# Patient Record
Sex: FEMALE | Race: WHITE | NOT HISPANIC OR LATINO | Employment: FULL TIME | URBAN - METROPOLITAN AREA
[De-identification: names, ages, dates, MRNs, and addresses within clinical notes are randomized per-mention and may not be internally consistent; named-entity substitution may affect disease eponyms.]

---
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
## 2020-05-01 ENCOUNTER — RECORDS - HEALTHEAST (OUTPATIENT)
Dept: LAB | Facility: CLINIC | Age: 20
End: 2020-05-01

## 2020-05-05 LAB
C TRACH DNA SPEC QL PROBE+SIG AMP: NEGATIVE
N GONORRHOEA DNA SPEC QL NAA+PROBE: NEGATIVE

## 2020-09-18 ENCOUNTER — RECORDS - HEALTHEAST (OUTPATIENT)
Dept: LAB | Facility: CLINIC | Age: 20
End: 2020-09-18

## 2020-09-18 LAB — HIV 1+2 AB+HIV1 P24 AG SERPL QL IA: NEGATIVE

## 2020-09-19 LAB — T PALLIDUM AB SER QL: NEGATIVE

## 2020-09-24 LAB
C TRACH DNA SPEC QL PROBE+SIG AMP: POSITIVE
N GONORRHOEA DNA SPEC QL NAA+PROBE: NEGATIVE

## 2021-08-09 ENCOUNTER — LAB REQUISITION (OUTPATIENT)
Dept: LAB | Facility: CLINIC | Age: 21
End: 2021-08-09

## 2021-08-09 DIAGNOSIS — Z11.3 ENCOUNTER FOR SCREENING FOR INFECTIONS WITH A PREDOMINANTLY SEXUAL MODE OF TRANSMISSION: ICD-10-CM

## 2021-08-09 LAB — HIV 1+2 AB+HIV1 P24 AG SERPL QL IA: NEGATIVE

## 2021-08-09 PROCEDURE — 87389 HIV-1 AG W/HIV-1&-2 AB AG IA: CPT | Performed by: PHYSICIAN ASSISTANT

## 2021-08-09 PROCEDURE — 87491 CHLMYD TRACH DNA AMP PROBE: CPT | Performed by: PHYSICIAN ASSISTANT

## 2021-08-09 PROCEDURE — 86780 TREPONEMA PALLIDUM: CPT | Performed by: PHYSICIAN ASSISTANT

## 2021-08-09 PROCEDURE — 87591 N.GONORRHOEAE DNA AMP PROB: CPT | Performed by: PHYSICIAN ASSISTANT

## 2021-08-10 LAB — T PALLIDUM AB SER QL: NEGATIVE

## 2021-08-11 LAB
C TRACH DNA SPEC QL NAA+PROBE: POSITIVE
N GONORRHOEA DNA SPEC QL NAA+PROBE: NEGATIVE

## 2023-01-11 ENCOUNTER — ANESTHESIA (OUTPATIENT)
Dept: SURGERY | Facility: HOSPITAL | Age: 23
End: 2023-01-11
Payer: COMMERCIAL

## 2023-01-11 ENCOUNTER — LAB REQUISITION (OUTPATIENT)
Dept: LAB | Facility: CLINIC | Age: 23
End: 2023-01-11

## 2023-01-11 ENCOUNTER — HOSPITAL ENCOUNTER (OUTPATIENT)
Facility: HOSPITAL | Age: 23
Discharge: HOME | End: 2023-01-11
Attending: EMERGENCY MEDICINE | Admitting: OBSTETRICS & GYNECOLOGY
Payer: COMMERCIAL

## 2023-01-11 ENCOUNTER — APPOINTMENT (OUTPATIENT)
Dept: ULTRASOUND IMAGING | Facility: HOSPITAL | Age: 23
End: 2023-01-11
Attending: EMERGENCY MEDICINE
Payer: COMMERCIAL

## 2023-01-11 ENCOUNTER — ANESTHESIA EVENT (OUTPATIENT)
Dept: SURGERY | Facility: HOSPITAL | Age: 23
End: 2023-01-11
Payer: COMMERCIAL

## 2023-01-11 VITALS
OXYGEN SATURATION: 98 % | HEART RATE: 88 BPM | TEMPERATURE: 97.9 F | BODY MASS INDEX: 25.13 KG/M2 | RESPIRATION RATE: 24 BRPM | WEIGHT: 151 LBS | DIASTOLIC BLOOD PRESSURE: 70 MMHG | SYSTOLIC BLOOD PRESSURE: 131 MMHG

## 2023-01-11 DIAGNOSIS — O20.9 HEMORRHAGE IN EARLY PREGNANCY, UNSPECIFIED: ICD-10-CM

## 2023-01-11 DIAGNOSIS — O00.90 RUPTURED ECTOPIC PREGNANCY: ICD-10-CM

## 2023-01-11 PROBLEM — O00.102 RUPTURED LEFT TUBAL ECTOPIC PREGNANCY CAUSING HEMOPERITONEUM: Status: ACTIVE | Noted: 2023-01-11

## 2023-01-11 PROBLEM — O00.102 RUPTURED LEFT TUBAL ECTOPIC PREGNANCY CAUSING HEMOPERITONEUM: Status: RESOLVED | Noted: 2023-01-11 | Resolved: 2023-01-11

## 2023-01-11 PROBLEM — K66.1 RUPTURED LEFT TUBAL ECTOPIC PREGNANCY CAUSING HEMOPERITONEUM: Status: RESOLVED | Noted: 2023-01-11 | Resolved: 2023-01-11

## 2023-01-11 PROBLEM — K66.1 RUPTURED LEFT TUBAL ECTOPIC PREGNANCY CAUSING HEMOPERITONEUM: Status: ACTIVE | Noted: 2023-01-11

## 2023-01-11 LAB
ABO/RH(D): NORMAL
ANION GAP SERPL CALCULATED.3IONS-SCNC: 10 MMOL/L (ref 7–15)
BASOPHILS # BLD AUTO: 0.1 10E3/UL (ref 0–0.2)
BASOPHILS NFR BLD AUTO: 1 %
BLD PROD TYP BPU: NORMAL
BLD PROD TYP BPU: NORMAL
BLOOD COMPONENT TYPE: NORMAL
BLOOD COMPONENT TYPE: NORMAL
BUN SERPL-MCNC: 6.4 MG/DL (ref 6–20)
CALCIUM SERPL-MCNC: 9.5 MG/DL (ref 8.6–10)
CHLORIDE SERPL-SCNC: 102 MMOL/L (ref 98–107)
CODING SYSTEM: NORMAL
CODING SYSTEM: NORMAL
CREAT SERPL-MCNC: 0.63 MG/DL (ref 0.51–0.95)
DEPRECATED HCO3 PLAS-SCNC: 26 MMOL/L (ref 22–29)
EOSINOPHIL # BLD AUTO: 0 10E3/UL (ref 0–0.7)
EOSINOPHIL NFR BLD AUTO: 1 %
ERYTHROCYTE [DISTWIDTH] IN BLOOD BY AUTOMATED COUNT: 12.6 % (ref 10–15)
GFR SERPL CREATININE-BSD FRML MDRD: >90 ML/MIN/1.73M2
GLUCOSE SERPL-MCNC: 93 MG/DL (ref 70–99)
HCG INTACT+B SERPL-ACNC: 4781 MIU/ML
HCT VFR BLD AUTO: 38 % (ref 35–47)
HGB BLD-MCNC: 12.5 G/DL (ref 11.7–15.7)
IMM GRANULOCYTES # BLD: 0 10E3/UL
IMM GRANULOCYTES NFR BLD: 0 %
LYMPHOCYTES # BLD AUTO: 2.1 10E3/UL (ref 0.8–5.3)
LYMPHOCYTES NFR BLD AUTO: 26 %
MCH RBC QN AUTO: 30.5 PG (ref 26.5–33)
MCHC RBC AUTO-ENTMCNC: 32.9 G/DL (ref 31.5–36.5)
MCV RBC AUTO: 93 FL (ref 78–100)
MONOCYTES # BLD AUTO: 0.5 10E3/UL (ref 0–1.3)
MONOCYTES NFR BLD AUTO: 6 %
NEUTROPHILS # BLD AUTO: 5.5 10E3/UL (ref 1.6–8.3)
NEUTROPHILS NFR BLD AUTO: 66 %
NRBC # BLD AUTO: 0 10E3/UL
NRBC BLD AUTO-RTO: 0 /100
PLATELET # BLD AUTO: 250 10E3/UL (ref 150–450)
POTASSIUM SERPL-SCNC: 3.6 MMOL/L (ref 3.4–5.3)
RADIOLOGIST FLAGS: ABNORMAL
RBC # BLD AUTO: 4.1 10E6/UL (ref 3.8–5.2)
SODIUM SERPL-SCNC: 138 MMOL/L (ref 136–145)
SPECIMEN EXPIRATION DATE: NORMAL
UNIT ABO/RH: NORMAL
UNIT ABO/RH: NORMAL
UNIT NUMBER: NORMAL
UNIT NUMBER: NORMAL
UNIT STATUS: NORMAL
UNIT STATUS: NORMAL
UNIT TYPE ISBT: 7300
UNIT TYPE ISBT: 7300
WBC # BLD AUTO: 8.1 10E3/UL (ref 4–11)

## 2023-01-11 PROCEDURE — 76801 OB US < 14 WKS SINGLE FETUS: CPT

## 2023-01-11 PROCEDURE — 99285 EMERGENCY DEPT VISIT HI MDM: CPT | Mod: 25

## 2023-01-11 PROCEDURE — 250N000025 HC SEVOFLURANE, PER MIN: Performed by: OBSTETRICS & GYNECOLOGY

## 2023-01-11 PROCEDURE — 258N000003 HC RX IP 258 OP 636: Performed by: OBSTETRICS & GYNECOLOGY

## 2023-01-11 PROCEDURE — 87491 CHLMYD TRACH DNA AMP PROBE: CPT | Performed by: OBSTETRICS & GYNECOLOGY

## 2023-01-11 PROCEDURE — 710N000012 HC RECOVERY PHASE 2, PER MINUTE: Performed by: OBSTETRICS & GYNECOLOGY

## 2023-01-11 PROCEDURE — 84702 CHORIONIC GONADOTROPIN TEST: CPT | Performed by: FAMILY MEDICINE

## 2023-01-11 PROCEDURE — 36415 COLL VENOUS BLD VENIPUNCTURE: CPT | Performed by: EMERGENCY MEDICINE

## 2023-01-11 PROCEDURE — 96360 HYDRATION IV INFUSION INIT: CPT

## 2023-01-11 PROCEDURE — 80048 BASIC METABOLIC PNL TOTAL CA: CPT | Performed by: EMERGENCY MEDICINE

## 2023-01-11 PROCEDURE — 250N000011 HC RX IP 250 OP 636: Performed by: ANESTHESIOLOGY

## 2023-01-11 PROCEDURE — 258N000003 HC RX IP 258 OP 636: Performed by: EMERGENCY MEDICINE

## 2023-01-11 PROCEDURE — 250N000011 HC RX IP 250 OP 636: Performed by: OBSTETRICS & GYNECOLOGY

## 2023-01-11 PROCEDURE — 272N000001 HC OR GENERAL SUPPLY STERILE: Performed by: OBSTETRICS & GYNECOLOGY

## 2023-01-11 PROCEDURE — 999N000141 HC STATISTIC PRE-PROCEDURE NURSING ASSESSMENT: Performed by: OBSTETRICS & GYNECOLOGY

## 2023-01-11 PROCEDURE — 370N000017 HC ANESTHESIA TECHNICAL FEE, PER MIN: Performed by: OBSTETRICS & GYNECOLOGY

## 2023-01-11 PROCEDURE — 86901 BLOOD TYPING SEROLOGIC RH(D): CPT | Performed by: EMERGENCY MEDICINE

## 2023-01-11 PROCEDURE — 96361 HYDRATE IV INFUSION ADD-ON: CPT | Mod: 59

## 2023-01-11 PROCEDURE — 258N000003 HC RX IP 258 OP 636: Performed by: ANESTHESIOLOGY

## 2023-01-11 PROCEDURE — 88305 TISSUE EXAM BY PATHOLOGIST: CPT | Mod: TC | Performed by: OBSTETRICS & GYNECOLOGY

## 2023-01-11 PROCEDURE — 710N000009 HC RECOVERY PHASE 1, LEVEL 1, PER MIN: Performed by: OBSTETRICS & GYNECOLOGY

## 2023-01-11 PROCEDURE — 360N000076 HC SURGERY LEVEL 3, PER MIN: Performed by: OBSTETRICS & GYNECOLOGY

## 2023-01-11 PROCEDURE — 88305 TISSUE EXAM BY PATHOLOGIST: CPT | Mod: 26 | Performed by: PATHOLOGY

## 2023-01-11 PROCEDURE — 250N000009 HC RX 250: Performed by: OBSTETRICS & GYNECOLOGY

## 2023-01-11 PROCEDURE — 84702 CHORIONIC GONADOTROPIN TEST: CPT | Performed by: EMERGENCY MEDICINE

## 2023-01-11 PROCEDURE — 250N000009 HC RX 250: Performed by: ANESTHESIOLOGY

## 2023-01-11 PROCEDURE — 85025 COMPLETE CBC W/AUTO DIFF WBC: CPT | Performed by: EMERGENCY MEDICINE

## 2023-01-11 RX ORDER — ONDANSETRON 4 MG/1
4 TABLET, ORALLY DISINTEGRATING ORAL EVERY 30 MIN PRN
Status: DISCONTINUED | OUTPATIENT
Start: 2023-01-11 | End: 2023-01-11 | Stop reason: HOSPADM

## 2023-01-11 RX ORDER — FENTANYL CITRATE 50 UG/ML
25 INJECTION, SOLUTION INTRAMUSCULAR; INTRAVENOUS EVERY 5 MIN PRN
Status: DISCONTINUED | OUTPATIENT
Start: 2023-01-11 | End: 2023-01-11 | Stop reason: HOSPADM

## 2023-01-11 RX ORDER — IBUPROFEN 200 MG
800 TABLET ORAL ONCE
Status: DISCONTINUED | OUTPATIENT
Start: 2023-01-11 | End: 2023-01-11 | Stop reason: HOSPADM

## 2023-01-11 RX ORDER — FENTANYL CITRATE 50 UG/ML
50 INJECTION, SOLUTION INTRAMUSCULAR; INTRAVENOUS EVERY 5 MIN PRN
Status: DISCONTINUED | OUTPATIENT
Start: 2023-01-11 | End: 2023-01-11 | Stop reason: HOSPADM

## 2023-01-11 RX ORDER — ONDANSETRON 2 MG/ML
INJECTION INTRAMUSCULAR; INTRAVENOUS PRN
Status: DISCONTINUED | OUTPATIENT
Start: 2023-01-11 | End: 2023-01-11

## 2023-01-11 RX ORDER — PROPOFOL 10 MG/ML
INJECTION, EMULSION INTRAVENOUS PRN
Status: DISCONTINUED | OUTPATIENT
Start: 2023-01-11 | End: 2023-01-11

## 2023-01-11 RX ORDER — HALOPERIDOL 5 MG/ML
1 INJECTION INTRAMUSCULAR
Status: DISCONTINUED | OUTPATIENT
Start: 2023-01-11 | End: 2023-01-11 | Stop reason: HOSPADM

## 2023-01-11 RX ORDER — SODIUM CHLORIDE, SODIUM LACTATE, POTASSIUM CHLORIDE, CALCIUM CHLORIDE 600; 310; 30; 20 MG/100ML; MG/100ML; MG/100ML; MG/100ML
INJECTION, SOLUTION INTRAVENOUS CONTINUOUS
Status: DISCONTINUED | OUTPATIENT
Start: 2023-01-11 | End: 2023-01-11 | Stop reason: HOSPADM

## 2023-01-11 RX ORDER — FENTANYL CITRATE 50 UG/ML
INJECTION, SOLUTION INTRAMUSCULAR; INTRAVENOUS PRN
Status: DISCONTINUED | OUTPATIENT
Start: 2023-01-11 | End: 2023-01-11

## 2023-01-11 RX ORDER — DEXAMETHASONE SODIUM PHOSPHATE 10 MG/ML
INJECTION, SOLUTION INTRAMUSCULAR; INTRAVENOUS PRN
Status: DISCONTINUED | OUTPATIENT
Start: 2023-01-11 | End: 2023-01-11

## 2023-01-11 RX ORDER — SODIUM CHLORIDE, SODIUM LACTATE, POTASSIUM CHLORIDE, AND CALCIUM CHLORIDE .6; .31; .03; .02 G/100ML; G/100ML; G/100ML; G/100ML
IRRIGANT IRRIGATION PRN
Status: DISCONTINUED | OUTPATIENT
Start: 2023-01-11 | End: 2023-01-11 | Stop reason: HOSPADM

## 2023-01-11 RX ORDER — LIDOCAINE 40 MG/G
CREAM TOPICAL
Status: DISCONTINUED | OUTPATIENT
Start: 2023-01-11 | End: 2023-01-11 | Stop reason: HOSPADM

## 2023-01-11 RX ORDER — OXYCODONE HYDROCHLORIDE 5 MG/1
5-10 TABLET ORAL EVERY 4 HOURS PRN
Qty: 10 TABLET | Refills: 0 | Status: SHIPPED | OUTPATIENT
Start: 2023-01-11

## 2023-01-11 RX ORDER — LIDOCAINE HYDROCHLORIDE 10 MG/ML
INJECTION, SOLUTION INFILTRATION; PERINEURAL PRN
Status: DISCONTINUED | OUTPATIENT
Start: 2023-01-11 | End: 2023-01-11

## 2023-01-11 RX ORDER — KETOROLAC TROMETHAMINE 30 MG/ML
15 INJECTION, SOLUTION INTRAMUSCULAR; INTRAVENOUS EVERY 6 HOURS PRN
Status: DISCONTINUED | OUTPATIENT
Start: 2023-01-11 | End: 2023-01-11 | Stop reason: HOSPADM

## 2023-01-11 RX ORDER — ONDANSETRON 2 MG/ML
4 INJECTION INTRAMUSCULAR; INTRAVENOUS EVERY 30 MIN PRN
Status: DISCONTINUED | OUTPATIENT
Start: 2023-01-11 | End: 2023-01-11 | Stop reason: HOSPADM

## 2023-01-11 RX ORDER — ACETAMINOPHEN 325 MG/1
975 TABLET ORAL ONCE
Status: DISCONTINUED | OUTPATIENT
Start: 2023-01-11 | End: 2023-01-11 | Stop reason: HOSPADM

## 2023-01-11 RX ORDER — IBUPROFEN 600 MG/1
600 TABLET, FILM COATED ORAL EVERY 6 HOURS PRN
Qty: 30 TABLET | Refills: 0 | Status: SHIPPED | OUTPATIENT
Start: 2023-01-11

## 2023-01-11 RX ORDER — PROPOFOL 10 MG/ML
INJECTION, EMULSION INTRAVENOUS CONTINUOUS PRN
Status: DISCONTINUED | OUTPATIENT
Start: 2023-01-11 | End: 2023-01-11

## 2023-01-11 RX ORDER — BUPIVACAINE HYDROCHLORIDE AND EPINEPHRINE 2.5; 5 MG/ML; UG/ML
INJECTION, SOLUTION EPIDURAL; INFILTRATION; INTRACAUDAL; PERINEURAL PRN
Status: DISCONTINUED | OUTPATIENT
Start: 2023-01-11 | End: 2023-01-11 | Stop reason: HOSPADM

## 2023-01-11 RX ORDER — CEFAZOLIN SODIUM/WATER 2 G/20 ML
SYRINGE (ML) INTRAVENOUS
Status: DISCONTINUED
Start: 2023-01-11 | End: 2023-01-11 | Stop reason: HOSPADM

## 2023-01-11 RX ADMIN — DEXAMETHASONE SODIUM PHOSPHATE 4 MG: 10 INJECTION, SOLUTION INTRAMUSCULAR; INTRAVENOUS at 18:01

## 2023-01-11 RX ADMIN — SODIUM CHLORIDE, POTASSIUM CHLORIDE, SODIUM LACTATE AND CALCIUM CHLORIDE: 600; 310; 30; 20 INJECTION, SOLUTION INTRAVENOUS at 17:48

## 2023-01-11 RX ADMIN — ROCURONIUM BROMIDE 30 MG: 50 INJECTION, SOLUTION INTRAVENOUS at 18:00

## 2023-01-11 RX ADMIN — PROPOFOL 200 MG: 10 INJECTION, EMULSION INTRAVENOUS at 17:54

## 2023-01-11 RX ADMIN — DEXAMETHASONE SODIUM PHOSPHATE 4 MG: 10 INJECTION, SOLUTION INTRAMUSCULAR; INTRAVENOUS at 17:58

## 2023-01-11 RX ADMIN — PHENYLEPHRINE HYDROCHLORIDE 50 MCG: 10 INJECTION INTRAVENOUS at 18:11

## 2023-01-11 RX ADMIN — FENTANYL CITRATE 50 MCG: 50 INJECTION, SOLUTION INTRAMUSCULAR; INTRAVENOUS at 19:10

## 2023-01-11 RX ADMIN — SUGAMMADEX 200 MG: 100 INJECTION, SOLUTION INTRAVENOUS at 18:41

## 2023-01-11 RX ADMIN — LIDOCAINE HYDROCHLORIDE 5 ML: 10 INJECTION, SOLUTION INFILTRATION; PERINEURAL at 17:54

## 2023-01-11 RX ADMIN — HYDROMORPHONE HYDROCHLORIDE 0.4 MG: 1 INJECTION, SOLUTION INTRAMUSCULAR; INTRAVENOUS; SUBCUTANEOUS at 19:42

## 2023-01-11 RX ADMIN — PROPOFOL 200 MCG/KG/MIN: 10 INJECTION, EMULSION INTRAVENOUS at 17:53

## 2023-01-11 RX ADMIN — FENTANYL CITRATE 50 MCG: 50 INJECTION, SOLUTION INTRAMUSCULAR; INTRAVENOUS at 19:32

## 2023-01-11 RX ADMIN — SODIUM CHLORIDE 1000 ML: 9 INJECTION, SOLUTION INTRAVENOUS at 14:15

## 2023-01-11 RX ADMIN — Medication 100 MG: at 17:54

## 2023-01-11 RX ADMIN — KETOROLAC TROMETHAMINE 15 MG: 30 INJECTION, SOLUTION INTRAMUSCULAR; INTRAVENOUS at 20:17

## 2023-01-11 RX ADMIN — FENTANYL CITRATE 100 MCG: 50 INJECTION, SOLUTION INTRAMUSCULAR; INTRAVENOUS at 17:54

## 2023-01-11 RX ADMIN — MIDAZOLAM 2 MG: 1 INJECTION INTRAMUSCULAR; INTRAVENOUS at 17:48

## 2023-01-11 RX ADMIN — HYDROMORPHONE HYDROCHLORIDE 0.4 MG: 1 INJECTION, SOLUTION INTRAMUSCULAR; INTRAVENOUS; SUBCUTANEOUS at 19:37

## 2023-01-11 RX ADMIN — ONDANSETRON 4 MG: 2 INJECTION INTRAMUSCULAR; INTRAVENOUS at 18:01

## 2023-01-11 ASSESSMENT — ACTIVITIES OF DAILY LIVING (ADL)
ADLS_ACUITY_SCORE: 35

## 2023-01-11 NOTE — CONSULTS
H&P - GYN     NAME: Kay Vieyra   : 2000   MRN: [id number]      [facility]    ADMISSION DATE: 2023    PCP:  Mahi Marie     CHIEF COMPLAINT: adnexal mass, fluid in the pelvis    HPI: I have been requested by Dr. Powell to evaluate Kay Vieyra for an adnexal mass which is suspicious for ectopic pregnancy. Patient is a 22 year old,  female that presented to the hospital from her PCP with concern for ectopic pregnancy. History is obtained through the patient and chart review. Patient reports that she has had the pain for 4 days and it was at it worst 4 days ago. . Patient admits to a history of  Chlamydia in 2021 and Gonorrhea in January and 2019     GYN/Menstral History: She has been taking birth control pills and doesn't usually miss any pills.  However, she moved in late November and mis-placed her pills.  Her pharmacy would not refill them, therefore she has been without.  She was unaware she was pregnant until today. Patient's last menstrual period was 2022. She reports the bleeding has been heavier than previously with her periods.      PAST MEDICAL HISTORY:  History reviewed. No pertinent past medical history.    PAST SURGICAL HISTORY:  History reviewed. No pertinent surgical history.   Appendectomy - laparoscopic    SOCIAL HISTORY:  Social History     Socioeconomic History     Marital status: Single     Spouse name: Not on file     Number of children: Not on file     Years of education: Not on file     Highest education level: Not on file   Occupational History     Not on file   Tobacco Use     Smoking status: Never     Smokeless tobacco: Never   Substance and Sexual Activity     Alcohol use: Never     Drug use: Never     Sexual activity: Yes     Partners: Male     Comment: Pill.    Other Topics Concern     Not on file   Social History Narrative     Not on file     Social Determinants of Health     Financial Resource Strain: Not on file   Food  Insecurity: Not on file   Transportation Needs: Not on file   Physical Activity: Not on file   Stress: Not on file   Social Connections: Not on file   Intimate Partner Violence: Not on file   Housing Stability: Not on file       MEDICATIONS:  Current Facility-Administered Medications   Medication     lactated ringers infusion     lidocaine (LMX4) cream     lidocaine 1 % 0.1-1 mL     sodium chloride (PF) 0.9% PF flush 3 mL     sodium chloride (PF) 0.9% PF flush 3 mL       ALLERGIES:  Allergies   Allergen Reactions     Sumatriptan Anaphylaxis       REVIEW OF SYSTEMS    Negative except what is stated in the HPI    PHYSICAL EXAM:  /66   Pulse 91   Temp 98.8  F (37.1  C) (Oral)   Resp 14   Wt 68.5 kg (151 lb)   LMP 12/06/2022   SpO2 99%   BMI 25.13 kg/m     General Appearance: Alert, appropriate appearance for age. No acute distress,   HEENT : Grossly normal  Chest/Respiratory: Normal chest wall and respirations. Clear to auscultation.,   Cardiovascular: Regular rate and rhythm   Gastrointestinal: left lower quadrant tenderness, no rebound or gaurding  Pelvic Exam Female:  deferred   Musculoskeletal Exam: Back IS non-tender, no cva tenderness, moving all four extremities  Skin: no rash or abnormalities,   Neurologic: Normal gait and speech, no tremor  Psychiatric: Alert and oriented, appropriate affect.    LABS   Latest Reference Range & Units 01/11/23 14:03   Sodium 136 - 145 mmol/L 138   Potassium 3.4 - 5.3 mmol/L 3.6   Chloride 98 - 107 mmol/L 102   Carbon Dioxide (CO2) 22 - 29 mmol/L 26   Urea Nitrogen 6.0 - 20.0 mg/dL 6.4   Creatinine 0.51 - 0.95 mg/dL 0.63   GFR Estimate >60 mL/min/1.73m2 >90   Calcium 8.6 - 10.0 mg/dL 9.5   Anion Gap 7 - 15 mmol/L 10   Glucose 70 - 99 mg/dL 93   hCG Quantitative <5 mIU/mL 4,781 (H)   WBC 4.0 - 11.0 10e3/uL 8.1   Hemoglobin 11.7 - 15.7 g/dL 12.5   Hematocrit 35.0 - 47.0 % 38.0   Platelet Count 150 - 450 10e3/uL 250   RBC Count 3.80 - 5.20 10e6/uL 4.10   MCV 78 - 100 fL  93   MCH 26.5 - 33.0 pg 30.5   MCHC 31.5 - 36.5 g/dL 32.9   RDW 10.0 - 15.0 % 12.6   % Neutrophils % 66   % Lymphocytes % 26   % Monocytes % 6   % Eosinophils % 1   % Basophils % 1   Absolute Basophils 0.0 - 0.2 10e3/uL 0.1   Absolute Eosinophils 0.0 - 0.7 10e3/uL 0.0   Absolute Immature Granulocytes <=0.4 10e3/uL 0.0   Absolute Lymphocytes 0.8 - 5.3 10e3/uL 2.1   Absolute Monocytes 0.0 - 1.3 10e3/uL 0.5   % Immature Granulocytes % 0   Absolute Neutrophils 1.6 - 8.3 10e3/uL 5.5   Absolute NRBCs 10e3/uL 0.0   NRBCs per 100 WBC <1 /100 0   ABO/Rh(D)  B POS   SPECIMEN EXPIRATION DATE  [id number]   (H): Data is abnormally high    Lab Results: personally reviewed.     IMAGING:  Narrative & Impression   EXAM: US OB <14 WEEKS WITH TRANSVAGINAL SINGLE  LOCATION: [facility]  DATE/TIME: 1/11/2023 4:07 PM     INDICATION: Possible ectopic.  COMPARISON: None.  TECHNIQUE: Transabdominal scans were performed. Endovaginal ultrasound was performed to better visualize the embryo.     FINDINGS:     UTERUS: No intrauterine gestational sac, yolk sac, or fetal pole is identified. No fibroids.     ENDOMETRIUM: Homogenous, smooth endometrium measuring 6 mm in thickness.     RIGHT OVARY: Measures 2.8 x 1.6 x 1.4 cm.     LEFT OVARY: Measures 4.0 x 2.3 x 2.6 cm and contains a small involuting corpus luteal cyst. There is an adjacent complex 2 x 2.3 x 1.6 cm left adnexal mass with Doppler flow around its periphery.     Large amount of complex free fluid within the pelvis.                                                                      IMPRESSION:      1.  Findings suspicious for a ruptured left adnexal ectopic pregnancy. No intrauterine pregnancy is identified, and there is a complex 2.3 cm left adnexal mass with a large amount of complex free fluid within the pelvis.     [Critical Result: Positive ectopic pregnancy]     Finding was identified on 1/11/2023 4:21 PM.      Dr. Powell was contacted by me on  1/11/2023 4:30 PM and verbalized understanding of the critical result.         Radiology Results: Personally reviewed impression/s    IMPRESSION:  22 year old female with left ectopic pregnancy    RECOMMENDATIONS:  Discussed findings with patient   Recommend diagnostic laparoscopy with possible left salpingectomy, will also evaluate right fallopian tube as indicated with tubal dye study.      Thank you for allowing us to participate in the care of this patient.  Please contact us with any questions/concerns.      Yojana Nesbitt DO, YUSUF, FACOG  [facility]      CC: Mahi Marie,

## 2023-01-11 NOTE — ED TRIAGE NOTES
Patient presents here for evaluation of left pelvic pain that began about 5 days ago. She is 4 weeks and 8 days gestation. She was seen at another clinic and sent here for further work up. She notes heavy vaginal bleeding.The provider was unsure of site of pregnancy. There was nothing seen within the uterus.

## 2023-01-11 NOTE — ED NOTES
"ED Provider In Triage Note  [facility]  Encounter Date: Jan 11, 2023    Chief Complaint   Patient presents with     Abdominal Pain       Brief HPI:   Kay Vieyra is a 22 year old female presenting to the Emergency Department with a chief complaint of LLQ pain, possible ectopic pregnancy on US.  Patient in clinic this AM, abnormal US, no intrauterine pregnancy, abnormality left fallopian tube and ovary per patient.  Patient told \"possible ectopic\" referred to ED for assessment.  5 weeks by LMP.  G1 PO.    Brief Physical Exam:  LMP 12/06/2022   General: Non-toxic appearing  HEENT: Atraumatic  Resp: No respiratory distress  Abdomen: Non-peritoneal  Neuro: Alert, oriented, answers questions appropriately  Psych: Behavior appropriate      Plan Initiated in Triage:  Orders Placed This Encounter     OB  US 1st trimester w transvag     Basic metabolic panel     HCG quantitative pregnancy     0.9% sodium chloride BOLUS       PIT Dispo:   Place patient in the next available ED bed    Adan Cespedes MD on 1/11/2023 at 1:45 PM    Patient was evaluated by the Physician in Triage due to a limitation of available rooms in the Emergency Department. A plan of care was discussed based on the information obtained on the initial evaluation and patient was consuled to return back to the Emergency Department lobby after this initial evalutaiton until results were obtained or a room became available in the Emergency Department. Patient was counseled not to leave prior to receiving the results of their workup.     Adan Cespedes MD  [facility] EMERGENCY DEPARTMENT  [address] [place] [id number]  [phone]     Adan Cespedes MD  01/11/23 1349    "

## 2023-01-11 NOTE — ED NOTES
Expected Patient Referral to ED  12:32 PM    Referring Clinic/Provider:  [facility]    Reason for referral/Clinical facts:  Possible ruptured ectopic    Recommendations provided:  Send to ED for further evaluation- check with OR to assure availability    Caller was informed that this institution does possess the capabilities and/or resources to provide for patient and should be transferred to our facility.    Discussed that if direct admit is sought and any hurdles are encountered, this ED would be happy to see the patient and evaluate.    Informed caller that recommendations provided are recommendations based only on the facts provided and that they responsible to accept or reject the advice, or to seek a formal in person consultation as needed and that this ED will see/treat patient should they arrive.      Jamin Starr MD  [facility] EMERGENCY DEPARTMENT  [address] [place] [id number]  [phone]     Jamin Starr MD  01/11/23 [id number]

## 2023-01-11 NOTE — ED PROVIDER NOTES
Emergency Department Encounter     Evaluation Date & Time:   2023  1:59 PM    CHIEF COMPLAINT:  Abdominal Pain      Triage Note:  Patient presents here for evaluation of left pelvic pain that began about 5 days ago. She is 4 weeks and 8 days gestation. She was seen at another clinic and sent here for further work up. She notes heavy vaginal bleeding.The provider was unsure of site of pregnancy. There was nothing seen within the uterus.                ED COURSE & MEDICAL DECISION MAKING:     ED Course as of 23   1451 CBC reassuring with normal Hgb at 12.6.  Rh+, so no Rhogam needed.     1640 US showing ruptured ectopic.  Will call obgyn to discuss.  Pt updated on results, plan.  Remains hemodynamically stable, Hgb currently 12.5, minimal pain.   1646 OB  US 1st trimester w transvag     Pt is a  at approximately 4 W with home pregnancy test + today, seen in outpatient clinic for 5 days of vaginal bleeding and worsening LLQ abd pain.  Reportedly outpatient US showing abnormal findings along left ovary/fallopian tube, but I do not have official report or images.  Pt seen through triage and labs, US ordered.  Pt currently with very benign abdomen, afebrile, normal CBC. Awaiting Hcg and US.  Rh+, so no need for Rhogam.  Will consult Ob if US abnormal, concerning for ectopic, which is primary emergent concern at this point.      2:30 PM I met with the patient, obtained history, performed an initial exam, and discussed options and plan for diagnostics and treatment here in the ED.  4:49 PM I spoke with Dr Nesbitt, OB, who will come in to evaluate the patient.   4:50 PM Pt aware/updated on this plan/discussion.      Dr. Nesbitt saw pt in ED and took her to the OR.  I was in with another critical patient for a prolonged period, so pt left with Ob surgical team while I was in with another patient.      At the conclusion of the encounter I discussed the results of all the tests and the  disposition. The questions were answered. The patient or family acknowledged understanding and was agreeable with the care plan.    Medical Decision Making    History:    Supplemental history from: Documented in HPI, if applicable    External Record(s) reviewed: Documented in \[facility] [place] [facility]\"", if applicable.    Work Up:    Chart documentation includes differential considered and any EKGs or imaging independently interpreted by provider.    In additional to work up documented, I considered the following work up: See chart documentation, if applicable.    External consultation:    Discussion of management with another provider: See chart documentation, if applicable    Complicating factors:    Care impacted by chronic illness: N/A    Care affected by social determinants of health: N/A    Disposition considerations: Admit.        MEDICATIONS GIVEN IN THE EMERGENCY DEPARTMENT:  Medications   acetaminophen (TYLENOL) tablet 975 mg (has no administration in time range)   ibuprofen (ADVIL/MOTRIN) tablet 800 mg (has no administration in time range)   ketorolac (TORADOL) injection 15 mg (15 mg Intravenous Given 23)   0.9% sodium chloride BOLUS (0 mLs Intravenous Stopped 23)       NEW PRESCRIPTIONS STARTED AT TODAY'S ED VISIT:  Current Discharge Medication List      START taking these medications    Details   oxyCODONE (ROXICODONE) 5 MG tablet Take 1-2 tablets (5-10 mg) by mouth every 4 hours as needed for moderate to severe pain  Qty: 10 tablet, Refills: 0    Associated Diagnoses: Ruptured ectopic pregnancy             [facility]     Kay Vieyra is a  with LMP 22, 22 year old female with a pertinent history of appendicitis who presents to this ED walk in for evaluation of vaginal bleeding and LLQ abdominal pain. Patient was taking oral birth control, to control her heavy periods. Patient states she has not been taking it for the last 3 weeks because she lost it.    Patient reports that 5 days ago she  began to have heavy bleeding with associated LLQ abd pain that has been worsening.  Now reporting feeling drowsy/dizzy. 4 days ago she reports having a headache and diarrhea.     Patient found out she was pregnant this morning while consulting with her PCP at [facility]. Imagining found her left ovary is inflamed and other findings regarding her left fallopian tube. Denies any sort of prior miscarriage or elective .    SHx - nicotine vape, marijuana, 1 alcoholic beverage per week.    REVIEW OF SYSTEMS:  Review of Systems   All other systems reviewed and are negative.  See HPI      Medical History   History reviewed. No pertinent past medical history.    History reviewed. No pertinent surgical history.    History reviewed. No pertinent family history.    Social History     Tobacco Use     Smoking status: Never     Smokeless tobacco: Never   Substance Use Topics     Alcohol use: Never     Drug use: Never       ibuprofen (ADVIL/MOTRIN) 600 MG tablet  oxyCODONE (ROXICODONE) 5 MG tablet        Physical Exam     Vitals:  /55   Pulse 94   Temp 97.9  F (36.6  C) (Temporal)   Resp 15   Wt 68.5 kg (151 lb)   LMP 2022   SpO2 98%   BMI 25.13 kg/m      PHYSICAL EXAM:   Physical Exam  Vitals and nursing note reviewed.   Constitutional:       General: She is not in acute distress.     Appearance: Normal appearance.   HENT:      Head: Normocephalic and atraumatic.      Nose: Nose normal.      Mouth/Throat:      Mouth: Mucous membranes are moist.   Eyes:      Pupils: Pupils are equal, round, and reactive to light.   Cardiovascular:      Rate and Rhythm: Normal rate and regular rhythm.      Pulses: Normal pulses.           Radial pulses are 2+ on the right side and 2+ on the left side.        Dorsalis pedis pulses are 2+ on the right side and 2+ on the left side.   Pulmonary:      Effort: Pulmonary effort is normal. No respiratory distress.      Breath sounds: Normal breath sounds.   Abdominal:       General: There is no distension.      Palpations: Abdomen is soft.      Tenderness: There is abdominal tenderness (minimal LLQ) in the left lower quadrant. Negative signs include McBurney's sign.      Comments: No rigidity.    Musculoskeletal:      Cervical back: Full passive range of motion without pain and neck supple.      Comments: No calf tenderness or swelling b/l   Skin:     General: Skin is warm.      Findings: No rash.   Neurological:      General: No focal deficit present.      Mental Status: She is alert. Mental status is at baseline.      Comments: Fluent speech, no acute lateralizing deficits   Psychiatric:         Mood and Affect: Mood normal.         Behavior: Behavior normal.         Results     LAB:  All pertinent labs reviewed and interpreted  Labs Ordered and Resulted from Time of ED Arrival to Time of ED Departure   HCG QUANTITATIVE PREGNANCY - Abnormal       Result Value    hCG Quantitative 4,781 (*)    BASIC METABOLIC PANEL - Normal    Sodium 138      Potassium 3.6      Chloride 102      Carbon Dioxide (CO2) 26      Anion Gap 10      Urea Nitrogen 6.4      Creatinine 0.63      Calcium 9.5      Glucose 93      GFR Estimate >90     CBC WITH PLATELETS AND DIFFERENTIAL    WBC Count 8.1      RBC Count 4.10      Hemoglobin 12.5      Hematocrit 38.0      MCV 93      MCH 30.5      MCHC 32.9      RDW 12.6      Platelet Count 250      % Neutrophils 66      % Lymphocytes 26      % Monocytes 6      % Eosinophils 1      % Basophils 1      % Immature Granulocytes 0      NRBCs per 100 WBC 0      Absolute Neutrophils 5.5      Absolute Lymphocytes 2.1      Absolute Monocytes 0.5      Absolute Eosinophils 0.0      Absolute Basophils 0.1      Absolute Immature Granulocytes 0.0      Absolute NRBCs 0.0     ABO AND RH    ABO/RH(D) B POS      SPECIMEN EXPIRATION DATE [id number]         RADIOLOGY:  OB  US 1st trimester w transvag   Final Result   Abnormal   IMPRESSION:       1.  Findings suspicious for a ruptured  left adnexal ectopic pregnancy. No intrauterine pregnancy is identified, and there is a complex 2.3 cm left adnexal mass with a large amount of complex free fluid within the pelvis.      [Critical Result: Positive ectopic pregnancy]      Finding was identified on 1/11/2023 4:21 PM.       Dr. Powell was contacted by me on 1/11/2023 4:30 PM and verbalized understanding of the critical result.                   ECG:  none    PROCEDURES:  Procedures:  none      FINAL IMPRESSION:    ICD-10-CM    1. Ruptured ectopic pregnancy  O00.90 oxyCODONE (ROXICODONE) 5 MG tablet     ibuprofen (ADVIL/MOTRIN) 600 MG tablet          CRITICAL CARE  35 minutes of critical care time spent managing ruptured ectopic pregnancy. Time spent interpreting labs, imaging, speaking with pt and consultants.  Time independent of any procedures performed.      I, Key Coyne, am serving as a scribe to document services personally performed by Dr. Antonio Powell, based on my observations and the provider's statements to me. I, Antonio Powell, DO attest that Key Coyne is acting in a scribe capacity, has observed my performance of the services and has documented them in accordance with my direction.      Antonio Powell DO  Emergency Medicine  [facility] EMERGENCY DEPARTMENT  1/11/2023  2:45 PM        Antonio Powell MD  01/11/23 2018

## 2023-01-11 NOTE — ANESTHESIA PREPROCEDURE EVALUATION
Anesthesia Pre-Procedure Evaluation    Patient: Kay Vieyra   MRN: [id number] : 2000        Procedure : Procedure(s):  DIAGNOSTIC LAPAROSCOPY FOR ECTOPIC          History reviewed. No pertinent past medical history.   History reviewed. No pertinent surgical history.   Allergies   Allergen Reactions     Sumatriptan Anaphylaxis      Social History     Tobacco Use     Smoking status: Never     Smokeless tobacco: Never   Substance Use Topics     Alcohol use: Never      Wt Readings from Last 1 Encounters:   23 68.5 kg (151 lb)        Anesthesia Evaluation   Pt has had prior anesthetic.     No history of anesthetic complications       ROS/MED HX  ENT/Pulmonary:  - neg pulmonary ROS     Neurologic:  - neg neurologic ROS     Cardiovascular:  - neg cardiovascular ROS     METS/Exercise Tolerance:     Hematologic:  - neg hematologic  ROS     Musculoskeletal:  - neg musculoskeletal ROS     GI/Hepatic: Comment: Denies nausea/vomiting - neg GI/hepatic ROS     Renal/Genitourinary:  - neg Renal ROS     Endo:  - neg endo ROS     Psychiatric/Substance Use:  - neg psychiatric ROS     Infectious Disease:  - neg infectious disease ROS     Malignancy:  - neg malignancy ROS     Other:  - neg other ROS    (+) Possibly pregnant (ruptured ectopic), ,         Physical Exam    Airway        Mallampati: II   TM distance: > 3 FB   Neck ROM: full   Mouth opening: > 3 cm    Respiratory Devices and Support         Dental  no notable dental history         Cardiovascular   cardiovascular exam normal       Rhythm and rate: regular and normal     Pulmonary   pulmonary exam normal        breath sounds clear to auscultation           OUTSIDE LABS:  CBC:   Lab Results   Component Value Date    WBC 8.1 2023    HGB 12.5 2023    HCT 38.0 2023     2023     BMP:   Lab Results   Component Value Date     2023    POTASSIUM 3.6 2023    CHLORIDE 102 2023    CO2 26 2023    BUN 6.4  01/11/2023    CR 0.63 01/11/2023    GLC 93 01/11/2023     COAGS: No results found for: PTT, INR, FIBR  POC: No results found for: BGM, HCG, HCGS  HEPATIC: No results found for: ALBUMIN, PROTTOTAL, ALT, AST, GGT, ALKPHOS, BILITOTAL, BILIDIRECT, JUS  OTHER:   Lab Results   Component Value Date    MAIN 9.5 01/11/2023    TSH 1.50 11/12/2018       Anesthesia Plan    ASA Status:  1, emergent    NPO Status:  NPO Appropriate    Anesthesia Type: General.     - Airway: ETT   Induction: Propofol, Intravenous, RSI.   Maintenance: Balanced.        Consents    Anesthesia Plan(s) and associated risks, benefits, and realistic alternatives discussed. Questions answered and patient/representative(s) expressed understanding.    - Discussed:     - Discussed with:  Patient         Postoperative Care    Pain management: Multi-modal analgesia.   PONV prophylaxis: Ondansetron (or other 5HT-3), Dexamethasone or Solumedrol     Comments:    Other Comments: Reviewed anesthetic options and risks, including risk of dental trauma. Patient agrees to proceed.               Cory Cheung MD

## 2023-01-11 NOTE — ED NOTES
Patient ambulated independently to bathroom. US called and stated it will still be some time before patient goes to US.

## 2023-01-12 LAB
C TRACH DNA SPEC QL PROBE+SIG AMP: NEGATIVE
HCG INTACT+B SERPL-ACNC: 4828 MIU/ML
N GONORRHOEA DNA SPEC QL NAA+PROBE: NEGATIVE

## 2023-01-12 NOTE — DISCHARGE INSTRUCTIONS
Discharge Instructions: After Your Surgery  You ve just had surgery. During surgery, you were given medicine called anesthesia to keep you relaxed and free of pain. After surgery, you may have some pain or nausea. This is common. Here are some tips for feeling better and getting well after surgery.     Stay on schedule with your medicine.   Going home  Your healthcare provider will show you how to take care of yourself when you go home. He or she will also answer your questions. Have an adult family member or friend drive you home. For the first 24 hours after your surgery:  Don't drive or use heavy equipment.  Don't make important decisions or sign legal papers.  Don't drink alcohol.  Have someone stay with you, if needed. He or she can watch for problems and help keep you safe.  Be sure to go to all follow-up visits with your healthcare provider. And rest after your surgery for as long as your healthcare provider tells you to.  Coping with pain  If you have pain after surgery, pain medicine will help you feel better. Take it as told, before pain becomes severe. Also, ask your healthcare provider or pharmacist about other ways to control pain. This might be with heat, ice, or relaxation. And follow any other instructions your surgeon or nurse gives you.  Tips for taking pain medicine  To get the best relief possible, remember these points:  Pain medicines can upset your stomach. Taking them with a little food may help.  Most pain relievers taken by mouth need at least 20 to 30 minutes to start to work.  Don't wait till your pain becomes severe before you take your medicine. Try to time your medicine so that you can take it before starting an activity. This might be before you get dressed, go for a walk, or sit down for dinner.  Constipation is a common side effect of pain medicines. Call your healthcare provider before taking any medicines such as laxatives or stool softeners to help ease constipation. Also ask  if you should skip any foods. Drinking lots of fluids and eating foods such as fruits and vegetables that are high in fiber can also help. Remember, don't take laxatives unless your surgeon has prescribed them.  Drinking alcohol and taking pain medicine can cause dizziness and slow your breathing. It can even be deadly. Don't drink alcohol while taking pain medicine.  Pain medicine can make you react more slowly to things. Don't drive or run machinery while taking pain medicine.  Your healthcare provider may tell you to take acetaminophen to help ease your pain. Ask him or her how much you are supposed to take each day. Acetaminophen or other pain relievers may interact with your prescription medicines or other over-the-counter (OTC) medicines. Some prescription medicines have acetaminophen and other ingredients. Using both prescription and OTC acetaminophen for pain can cause you to overdose. Read the labels on your OTC medicines with care. This will help you to clearly know the list of ingredients, how much to take, and any warnings. It may also help you not take too much acetaminophen. If you have questions or don't understand the information, ask your pharmacist or healthcare provider to explain it to you before you take the OTC medicine.  Managing nausea  Some people have an upset stomach after surgery. This is often because of anesthesia, pain, or pain medicine, or the stress of surgery. These tips will help you handle nausea and eat healthy foods as you get better. If you were on a special food plan before surgery, ask your healthcare provider if you should follow it while you get better. These tips may help:  Don't push yourself to eat. Your body will tell you when to eat and how much.  Start off with clear liquids and soup. They are easier to digest.  Next try semi-solid foods, such as mashed potatoes, applesauce, and gelatin, as you feel ready.  Slowly move to solid foods. Don t eat fatty, rich, or spicy  foods at first.  Don't force yourself to have 3 large meals a day. Instead eat smaller amounts more often.  Take pain medicines with a small amount of solid food, such as crackers or toast, to prevent nausea.  When to call your healthcare provider  Call your healthcare provider if:  You still have intolerable pain an hour after taking medicine. The medicine may not be strong enough.  You feel too sleepy, dizzy, or groggy. The medicine may be too strong.  You have side effects such as nausea or vomiting, or skin changes such as rash, itching, or hives. Your healthcare provider may suggest other medicines to control side effects.  Rash, itching, or hives may mean you have an allergic reaction. Report this right away. If you have trouble breathing or facial swelling, call 911 right away.  If you have obstructive sleep apnea  You were given anesthesia medicine during surgery to keep you comfortable and free of pain. After surgery, you may have more apnea spells because of this medicine and other medicines you were given. The spells may last longer than usual.   At home:  Keep using the continuous positive airway pressure (CPAP) device when you sleep. Unless your healthcare provider tells you not to, use it when you sleep, day or night. CPAP is a common device used to treat obstructive sleep apnea.  Talk with your provider before taking any pain medicine, muscle relaxants, or sedatives. Your provider will tell you about the possible dangers of taking these medicines.  [employer] last reviewed this educational content on 3/1/2019    0699-0719 [employer]. All rights reserved. This information is not intended as a substitute for professional medical care. Always follow your healthcare professional's instructions.

## 2023-01-12 NOTE — OP NOTE
Operative Note    Pre-operative diagnosis: Ectopic pregnancy [O00.90]   Post-operative diagnosis Ruptured ectopic pregnancy in the left fallopian tube, hemoperitoneum   Procedure: Procedure(s):  LAPAROSCOPY FOR ECTOPIC, LAPAROSCOPIC LEFT SALPINGECTOMY, TUBAL DYE STUDY, EVACUATION OF HEMOPERITONEUM   Surgeon: Yojana Nesbitt DO   Assistants(s): none   Estimated blood loss: Less than 50 ml    Specimens: Left fallopian tube containing ectopic pregnancy   Findings: Enlarged left fallopian tube containing ectopic pregnancy, moderate hemoperitoneum, scarring/adhesion from prior appendectomy, occluded RIGHT fallopian tube      Indication:  Patient is a  23y/o G1, now  who presented to the ED for vaginal bleeding and suspected ectopic pregnancy.  This was confirmed with ultrasound showing a left tubal mass and free pelvic fluid with a beta hcg >4700 and no intrauterine pregnancy.  Indications, alternatives of the procedure, benefits and risks were discussed with the patient, including but not limited to: risk of hemorrhage, infection, damage to surrounding organs and anesthetic complications.  Patient demonstrated understanding, all questions were answered, consents have been signed, and patient wished to proceed with surgery.    Operative Procedure:  The patient was taken to the operating room where general anesthesia was administered without difficulty.  IPCs were placed. The patient was prepped and draped in the normal sterile fashion in dorsal lithotomy position.  Time out was performed.     A kendrick catheter was placed. Surgeon's gloves were then changed and attention was turned to the abdomen.      Local anesthetic was then injected into the umbilical fold and a vertical skin incision in the umbilical fold was made with the scalpel.  A Veress needle was placed with the abdomen elevated.  CO2 gas was connected and pneumoperitoneum was established after saline drop test performed. The Veress needle was removed  and a 5mm trocar was inserted into the abdomen using direct visualization.  Intra-abdominal placement was confirmed with laparoscope.  A second 5mm incision was made and 5mm port was placed approximately 10cm lateral to the umbilicus on the right side after local anesthetic was injected and similarly an 5 mm port was placed just superior to this.  Trendelenburg positioning and blunt probe were used to facilitate moving and retracting bowel from out of the pelvis.     Inspection of the pelvis revealed findings as noted above.  Suction  was used to remove the blood and clot from the pelvis.     Attention was turned to the left side where the left fallopian tube was grasped with laparoscopic grasper and elevated.  The mesosalpinx along the length of the fallopian tube were then clamped, cauterized and transected with the Ligasure device.  The left fallopian tube was placed in a 5mm laparoscopic bag and removed from the abdomen.    Attention was turned to the vagina. A speculum was inserted in the posterior aspect of the vagina.  A single toothed-tenaculum was used to grasp the anterior lip of the cervix. The KAIT uterine manipulator was placed.  Saline with methylene blue was injected into the manipulator to check for right fallopian tube patency.  After 50ml was injected, no dye spillage was noted.  Of note, the dye did extravasate into the uterine vasculature.  The manipulator and teneculum were removed. The speculum was then removed.    Attention was returned to the abdomen after surgeon's gloves were changed. All instruments were then removed from the abdomen,  pneumoperitoneum was reduced, and trocars were removed. The incisions were closed using 4-0 Vicryl, and pressure dressing and bandages applied. The kendrick was removed. Gonorrhea and chlamydia swab was collected.     The patient tolerated the procedure well. Sponge, needle and instrument counts were correct x 2.  The patient was taken to the recovery  room in stable condition.  The patient will go home after recovering in postop area and meeting all criteria for discharge.  She was given a prescription Oxycodone #10 and was instructed to follow up within 2 weeks.     Yojana Nesbitt DO, FACOOG, NICOG  [facility]

## 2023-01-12 NOTE — ANESTHESIA POSTPROCEDURE EVALUATION
Patient: Kay Vieyra    Procedure: Procedure(s):  LAPAROSCOPY FOR ECTOPIC, LAPAROSCOPIC LEFT SALPINGECTOMY, TUBAL DYE STUDY, EVACUATION OF HEMOPERITONEUM       Anesthesia Type:  General    Note:  Disposition: Inpatient   Postop Pain Control: Uneventful            Sign Out: Well controlled pain   PONV: No   Neuro/Psych: Uneventful            Sign Out: Acceptable/Baseline neuro status   Airway/Respiratory: Uneventful            Sign Out: Acceptable/Baseline resp. status   CV/Hemodynamics: Uneventful            Sign Out: Acceptable CV status; No obvious hypovolemia; No obvious fluid overload   Other NRE: NONE   DID A NON-ROUTINE EVENT OCCUR? No           Last vitals:  Vitals Value Taken Time   /56 01/11/23 1930   Temp 36.6  C (97.9  F) 01/11/23 1900   Pulse 88 01/11/23 1931   Resp 16 01/11/23 1931   SpO2 100 % 01/11/23 1931   Vitals shown include unvalidated device data.    Electronically Signed By: Gaib Rowe MD  January 11, 2023  7:32 PM

## 2023-01-12 NOTE — ANESTHESIA CARE TRANSFER NOTE
Patient: Kay Vieyra    Procedure: Procedure(s):  LAPAROSCOPY FOR ECTOPIC, LAPAROSCOPIC LEFT SALPINGECTOMY, TUBAL DYE STUDY, EVACUATION OF HEMOPERITONEUM       Diagnosis: Ectopic pregnancy [O00.90]  Diagnosis Additional Information: No value filed.    Anesthesia Type:   General     Note:    Oropharynx: oropharynx clear of all foreign objects  Level of Consciousness: drowsy  Oxygen Supplementation: face mask  Level of Supplemental Oxygen (L/min / FiO2): 6  Independent Airway: airway patency satisfactory and stable  Dentition: dentition unchanged  Vital Signs Stable: post-procedure vital signs reviewed and stable  Report to RN Given: handoff report given  Patient transferred to: PACU    Handoff Report: Identifed the Patient, Identified the Reponsible Provider, Reviewed the pertinent medical history, Discussed the surgical course, Reviewed Intra-OP anesthesia mangement and issues during anesthesia, Set expectations for post-procedure period and Allowed opportunity for questions and acknowledgement of understanding      Vitals:  Vitals Value Taken Time   /60 01/11/23 1901   Temp 36.6  C (97.9  F) 01/11/23 1900   Pulse 99 01/11/23 1903   Resp 13 01/11/23 1903   SpO2 100 % 01/11/23 1903   Vitals shown include unvalidated device data.    Electronically Signed By: AYAKA Solis CRNA  January 11, 2023  7:04 PM

## 2023-01-13 LAB
PATH REPORT.COMMENTS IMP SPEC: NORMAL
PATH REPORT.COMMENTS IMP SPEC: NORMAL
PATH REPORT.FINAL DX SPEC: NORMAL
PATH REPORT.GROSS SPEC: NORMAL
PATH REPORT.MICROSCOPIC SPEC OTHER STN: NORMAL
PATH REPORT.RELEVANT HX SPEC: NORMAL
PHOTO IMAGE: NORMAL

## 2023-11-29 ENCOUNTER — LAB REQUISITION (OUTPATIENT)
Dept: LAB | Facility: CLINIC | Age: 23
End: 2023-11-29

## 2023-11-29 DIAGNOSIS — Z11.3 ENCOUNTER FOR SCREENING FOR INFECTIONS WITH A PREDOMINANTLY SEXUAL MODE OF TRANSMISSION: ICD-10-CM

## 2023-11-29 PROCEDURE — 87491 CHLMYD TRACH DNA AMP PROBE: CPT | Performed by: STUDENT IN AN ORGANIZED HEALTH CARE EDUCATION/TRAINING PROGRAM

## 2023-11-29 PROCEDURE — 87591 N.GONORRHOEAE DNA AMP PROB: CPT | Performed by: STUDENT IN AN ORGANIZED HEALTH CARE EDUCATION/TRAINING PROGRAM

## 2023-12-01 LAB
C TRACH DNA SPEC QL PROBE+SIG AMP: NEGATIVE
N GONORRHOEA DNA SPEC QL NAA+PROBE: NEGATIVE

## 2024-05-28 LAB
HEPATITIS B SURFACE ANTIGEN (EXTERNAL): NEGATIVE
HIV1+2 AB SERPL QL IA: NEGATIVE
RUBELLA ANTIBODY IGG (EXTERNAL): NORMAL
VDRL (SYPHILIS) (EXTERNAL): NONREACTIVE

## 2024-12-02 ENCOUNTER — HOSPITAL ENCOUNTER (OUTPATIENT)
Facility: CLINIC | Age: 24
Discharge: HOME | End: 2024-12-02
Attending: OBSTETRICS & GYNECOLOGY | Admitting: OBSTETRICS & GYNECOLOGY
Payer: COMMERCIAL

## 2024-12-02 VITALS
HEIGHT: 66 IN | DIASTOLIC BLOOD PRESSURE: 74 MMHG | SYSTOLIC BLOOD PRESSURE: 119 MMHG | TEMPERATURE: 98.4 F | BODY MASS INDEX: 33.43 KG/M2 | WEIGHT: 208 LBS | RESPIRATION RATE: 16 BRPM

## 2024-12-02 PROBLEM — Z36.89 ENCOUNTER FOR TRIAGE IN PREGNANT PATIENT: Status: ACTIVE | Noted: 2024-12-02

## 2024-12-02 LAB
ALBUMIN MFR UR ELPH: 11.9 MG/DL
ALBUMIN SERPL BCG-MCNC: 3.6 G/DL (ref 3.5–5.2)
ALP SERPL-CCNC: 146 U/L (ref 40–150)
ALT SERPL W P-5'-P-CCNC: 14 U/L (ref 0–50)
ANION GAP SERPL CALCULATED.3IONS-SCNC: 12 MMOL/L (ref 7–15)
AST SERPL W P-5'-P-CCNC: 19 U/L (ref 0–45)
BILIRUB SERPL-MCNC: 0.5 MG/DL
BUN SERPL-MCNC: 7.9 MG/DL (ref 6–20)
CALCIUM SERPL-MCNC: 9 MG/DL (ref 8.8–10.4)
CHLORIDE SERPL-SCNC: 105 MMOL/L (ref 98–107)
CREAT SERPL-MCNC: 0.55 MG/DL (ref 0.51–0.95)
CREAT UR-MCNC: 65.4 MG/DL
EGFRCR SERPLBLD CKD-EPI 2021: >90 ML/MIN/1.73M2
ERYTHROCYTE [DISTWIDTH] IN BLOOD BY AUTOMATED COUNT: 14.1 % (ref 10–15)
GLUCOSE SERPL-MCNC: 83 MG/DL (ref 70–99)
HCO3 SERPL-SCNC: 19 MMOL/L (ref 22–29)
HCT VFR BLD AUTO: 35 % (ref 35–47)
HGB BLD-MCNC: 12.2 G/DL (ref 11.7–15.7)
MCH RBC QN AUTO: 32.4 PG (ref 26.5–33)
MCHC RBC AUTO-ENTMCNC: 34.9 G/DL (ref 31.5–36.5)
MCV RBC AUTO: 93 FL (ref 78–100)
PLATELET # BLD AUTO: 148 10E3/UL (ref 150–450)
POTASSIUM SERPL-SCNC: 3.9 MMOL/L (ref 3.4–5.3)
PROT SERPL-MCNC: 5.9 G/DL (ref 6.4–8.3)
PROT/CREAT 24H UR: 0.18 MG/MG CR (ref 0–0.2)
RBC # BLD AUTO: 3.76 10E6/UL (ref 3.8–5.2)
SODIUM SERPL-SCNC: 136 MMOL/L (ref 135–145)
WBC # BLD AUTO: 10.7 10E3/UL (ref 4–11)

## 2024-12-02 PROCEDURE — 84075 ASSAY ALKALINE PHOSPHATASE: CPT | Performed by: OBSTETRICS & GYNECOLOGY

## 2024-12-02 PROCEDURE — 84156 ASSAY OF PROTEIN URINE: CPT | Performed by: OBSTETRICS & GYNECOLOGY

## 2024-12-02 PROCEDURE — 36415 COLL VENOUS BLD VENIPUNCTURE: CPT | Performed by: OBSTETRICS & GYNECOLOGY

## 2024-12-02 PROCEDURE — 82435 ASSAY OF BLOOD CHLORIDE: CPT | Performed by: OBSTETRICS & GYNECOLOGY

## 2024-12-02 PROCEDURE — 84155 ASSAY OF PROTEIN SERUM: CPT | Performed by: OBSTETRICS & GYNECOLOGY

## 2024-12-02 PROCEDURE — 82247 BILIRUBIN TOTAL: CPT | Performed by: OBSTETRICS & GYNECOLOGY

## 2024-12-02 PROCEDURE — 85048 AUTOMATED LEUKOCYTE COUNT: CPT | Performed by: OBSTETRICS & GYNECOLOGY

## 2024-12-02 PROCEDURE — 85014 HEMATOCRIT: CPT | Performed by: OBSTETRICS & GYNECOLOGY

## 2024-12-02 PROCEDURE — G0463 HOSPITAL OUTPT CLINIC VISIT: HCPCS

## 2024-12-02 RX ORDER — CLOTRIMAZOLE AND BETAMETHASONE DIPROPIONATE 10; .64 MG/G; MG/G
CREAM TOPICAL
COMMUNITY
Start: 2024-11-04

## 2024-12-02 RX ORDER — DULOXETIN HYDROCHLORIDE 60 MG/1
60 CAPSULE, DELAYED RELEASE ORAL DAILY
COMMUNITY

## 2024-12-02 RX ORDER — ALBUTEROL SULFATE 90 UG/1
AEROSOL, METERED RESPIRATORY (INHALATION)
COMMUNITY
Start: 2024-03-18

## 2024-12-02 RX ORDER — ONDANSETRON 4 MG/1
4 TABLET, FILM COATED ORAL
COMMUNITY
Start: 2024-05-21

## 2024-12-02 RX ORDER — LIDOCAINE 40 MG/G
CREAM TOPICAL
Status: DISCONTINUED | OUTPATIENT
Start: 2024-12-02 | End: 2024-12-02 | Stop reason: HOSPADM

## 2024-12-02 RX ORDER — VALACYCLOVIR HYDROCHLORIDE 500 MG/1
1 TABLET, FILM COATED ORAL
COMMUNITY
Start: 2024-10-23

## 2024-12-02 RX ORDER — DULOXETIN HYDROCHLORIDE 30 MG/1
CAPSULE, DELAYED RELEASE ORAL
COMMUNITY
Start: 2024-11-29

## 2024-12-02 ASSESSMENT — ACTIVITIES OF DAILY LIVING (ADL)
ADLS_ACUITY_SCORE: 15
ADLS_ACUITY_SCORE: 15

## 2024-12-02 NOTE — PROGRESS NOTES
Patient triaged for rule out preeclampsia. Vital signs are stable, 's. She has a cat I strip. Labs reviewed by provider and orders received to discharge home.   Esther Coy RN on 12/2/2024 at 11:54 AM

## 2024-12-02 NOTE — DISCHARGE INSTRUCTIONS
Learning About When to Call Your Doctor During Pregnancy (After 20 Weeks)  Overview  It's common to have concerns about what might be a problem when you're pregnant. Most pregnancies don't have any serious problems. But it's still important to know when to call your doctor if you have certain symptoms or signs of labor.  These are general suggestions. Your doctor may give you some more information about when to call.  When to call your doctor (after 20 weeks)  Call 911  anytime you think you may need emergency care. For example, call if:  You have severe vaginal bleeding. This means you are soaking through a pad each hour for 2 or more hours.  You have sudden, severe pain in your belly.  You have chest pain, are short of breath, or cough up blood.  You passed out (lost consciousness).  You have a seizure.  You see or feel the umbilical cord.  You think you are about to deliver your baby and can't make it safely to the hospital or birthing center.  Call your doctor now or seek immediate medical care if:  You have vaginal bleeding.  You have belly pain.  You have a fever.  You are dizzy or lightheaded, or you feel like you may faint.  You have signs of a blood clot in your leg (called a deep vein thrombosis), such as:  Pain in the calf, back of the knee, thigh, or groin.  Swelling in your leg or groin.  A color change on the leg or groin. The skin may be reddish or purplish, depending on your usual skin color.  You have symptoms of preeclampsia, such as:  Sudden swelling of your face, hands, or feet.  New vision problems (such as dimness, blurring, or seeing spots).  A severe headache.  You have a sudden release of fluid from your vagina. (You think your water broke.)  You've been having regular contractions for an hour. This means that you've had at least 6 contractions within 1 hour, even after you change your position and drink fluids.  You notice that your baby has stopped moving or is moving less than  "normal.  You have signs of heart failure, such as:  New or increased shortness of breath.  New or worse swelling in your legs, ankles, or feet.  Sudden weight gain, such as more than 2 to 3 pounds in a day or 5 pounds in a week.  Feeling so tired or weak that you cannot do your usual activities.  You have symptoms of a urinary tract infection. These may include:  Pain or burning when you urinate.  A frequent need to urinate without being able to pass much urine.  Pain in the flank, which is just below the rib cage and above the waist on either side of the back.  Blood in your urine.  Watch closely for changes in your health, and be sure to contact your doctor if:  You have vaginal discharge that smells bad.  You feel sad, anxious, or hopeless for more than a few days.  You have skin changes, such as a rash, itching, or a yellow color to your skin.  You have other concerns about your pregnancy.  If you have labor signs at 37 weeks or more  If you have signs of labor at 37 weeks or more, your doctor may tell you to call when your labor becomes more active. Symptoms of active labor include:  Contractions that are regular.  Contractions that are less than 5 minutes apart.  Contractions that are hard to talk through.  Follow-up care is a key part of your treatment and safety. Be sure to make and go to all appointments, and call your doctor if you are having problems. It's also a good idea to know your test results and keep a list of the medicines you take.  Where can you learn more?  Go to https://[email or web address].net/patiented  Enter N531 in the search box to learn more about \"Learning About When to Call Your Doctor During Pregnancy (After 20 Weeks).\"  Current as of: July 10, 2023  Content Version: 14.2 2024 [facility] [employer].   Care instructions adapted under license by your healthcare professional. If you have questions about a medical condition or this instruction, always ask your healthcare professional. " [employer] disclaims any warranty or liability for your use of this information.

## 2024-12-10 ENCOUNTER — HOSPITAL ENCOUNTER (INPATIENT)
Facility: CLINIC | Age: 24
End: 2024-12-10
Attending: OBSTETRICS & GYNECOLOGY | Admitting: OBSTETRICS & GYNECOLOGY
Payer: COMMERCIAL

## 2024-12-10 ENCOUNTER — ANESTHESIA EVENT (OUTPATIENT)
Dept: OBGYN | Facility: CLINIC | Age: 24
End: 2024-12-10
Payer: COMMERCIAL

## 2024-12-10 ENCOUNTER — ANESTHESIA (OUTPATIENT)
Dept: OBGYN | Facility: CLINIC | Age: 24
End: 2024-12-10
Payer: COMMERCIAL

## 2024-12-10 DIAGNOSIS — Z98.891 S/P CESAREAN SECTION: Primary | ICD-10-CM

## 2024-12-10 PROBLEM — O13.9 GESTATIONAL HTN: Status: ACTIVE | Noted: 2024-12-10

## 2024-12-10 LAB
ABO + RH BLD: NORMAL
BLD GP AB SCN SERPL QL: NEGATIVE
HGB BLD-MCNC: 11.5 G/DL (ref 11.7–15.7)
PLATELET # BLD AUTO: 138 10E3/UL (ref 150–450)
SPECIMEN EXP DATE BLD: NORMAL
T PALLIDUM AB SER QL: NONREACTIVE

## 2024-12-10 PROCEDURE — 258N000003 HC RX IP 258 OP 636: Performed by: OBSTETRICS & GYNECOLOGY

## 2024-12-10 PROCEDURE — 86780 TREPONEMA PALLIDUM: CPT | Performed by: OBSTETRICS & GYNECOLOGY

## 2024-12-10 PROCEDURE — 250N000011 HC RX IP 250 OP 636: Performed by: NURSE ANESTHETIST, CERTIFIED REGISTERED

## 2024-12-10 PROCEDURE — 258N000003 HC RX IP 258 OP 636: Performed by: NURSE ANESTHETIST, CERTIFIED REGISTERED

## 2024-12-10 PROCEDURE — 272N000001 HC OR GENERAL SUPPLY STERILE: Performed by: OBSTETRICS & GYNECOLOGY

## 2024-12-10 PROCEDURE — 86900 BLOOD TYPING SEROLOGIC ABO: CPT | Performed by: OBSTETRICS & GYNECOLOGY

## 2024-12-10 PROCEDURE — 370N000017 HC ANESTHESIA TECHNICAL FEE, PER MIN: Performed by: OBSTETRICS & GYNECOLOGY

## 2024-12-10 PROCEDURE — 999N000016 HC STATISTIC ATTENDANCE AT DELIVERY

## 2024-12-10 PROCEDURE — 360N000076 HC SURGERY LEVEL 3, PER MIN: Performed by: OBSTETRICS & GYNECOLOGY

## 2024-12-10 PROCEDURE — 250N000013 HC RX MED GY IP 250 OP 250 PS 637: Performed by: OBSTETRICS & GYNECOLOGY

## 2024-12-10 PROCEDURE — 250N000011 HC RX IP 250 OP 636: Performed by: OBSTETRICS & GYNECOLOGY

## 2024-12-10 PROCEDURE — 250N000009 HC RX 250: Performed by: NURSE ANESTHETIST, CERTIFIED REGISTERED

## 2024-12-10 PROCEDURE — 250N000011 HC RX IP 250 OP 636: Performed by: ANESTHESIOLOGY

## 2024-12-10 PROCEDURE — 85049 AUTOMATED PLATELET COUNT: CPT | Performed by: OBSTETRICS & GYNECOLOGY

## 2024-12-10 PROCEDURE — 250N000009 HC RX 250: Performed by: OBSTETRICS & GYNECOLOGY

## 2024-12-10 PROCEDURE — C9290 INJ, BUPIVACAINE LIPOSOME: HCPCS | Performed by: ANESTHESIOLOGY

## 2024-12-10 PROCEDURE — 999N000249 HC STATISTIC C-SECTION ON UNIT

## 2024-12-10 PROCEDURE — 120N000013 HC R&B IMCU

## 2024-12-10 PROCEDURE — 36415 COLL VENOUS BLD VENIPUNCTURE: CPT | Performed by: OBSTETRICS & GYNECOLOGY

## 2024-12-10 PROCEDURE — 85018 HEMOGLOBIN: CPT | Performed by: OBSTETRICS & GYNECOLOGY

## 2024-12-10 RX ORDER — KETOROLAC TROMETHAMINE 30 MG/ML
INJECTION, SOLUTION INTRAMUSCULAR; INTRAVENOUS PRN
Status: DISCONTINUED | OUTPATIENT
Start: 2024-12-10 | End: 2024-12-10

## 2024-12-10 RX ORDER — LIDOCAINE 40 MG/G
CREAM TOPICAL
Status: DISCONTINUED | OUTPATIENT
Start: 2024-12-10 | End: 2024-12-10 | Stop reason: HOSPADM

## 2024-12-10 RX ORDER — OXYTOCIN 10 [USP'U]/ML
10 INJECTION, SOLUTION INTRAMUSCULAR; INTRAVENOUS
Status: DISCONTINUED | OUTPATIENT
Start: 2024-12-10 | End: 2024-12-13 | Stop reason: HOSPADM

## 2024-12-10 RX ORDER — MODIFIED LANOLIN
OINTMENT (GRAM) TOPICAL
Status: DISCONTINUED | OUTPATIENT
Start: 2024-12-10 | End: 2024-12-13 | Stop reason: HOSPADM

## 2024-12-10 RX ORDER — LOPERAMIDE HYDROCHLORIDE 2 MG/1
4 CAPSULE ORAL
Status: DISCONTINUED | OUTPATIENT
Start: 2024-12-10 | End: 2024-12-13 | Stop reason: HOSPADM

## 2024-12-10 RX ORDER — MISOPROSTOL 200 UG/1
400 TABLET ORAL
Status: DISCONTINUED | OUTPATIENT
Start: 2024-12-10 | End: 2024-12-10 | Stop reason: HOSPADM

## 2024-12-10 RX ORDER — SIMETHICONE 80 MG
80 TABLET,CHEWABLE ORAL 4 TIMES DAILY PRN
Status: DISCONTINUED | OUTPATIENT
Start: 2024-12-10 | End: 2024-12-13 | Stop reason: HOSPADM

## 2024-12-10 RX ORDER — METOCLOPRAMIDE 10 MG/1
10 TABLET ORAL EVERY 6 HOURS PRN
Status: DISCONTINUED | OUTPATIENT
Start: 2024-12-10 | End: 2024-12-13 | Stop reason: HOSPADM

## 2024-12-10 RX ORDER — FENTANYL CITRATE-0.9 % NACL/PF 10 MCG/ML
100 PLASTIC BAG, INJECTION (ML) INTRAVENOUS EVERY 5 MIN PRN
Status: DISCONTINUED | OUTPATIENT
Start: 2024-12-10 | End: 2024-12-10 | Stop reason: HOSPADM

## 2024-12-10 RX ORDER — BISACODYL 10 MG
10 SUPPOSITORY, RECTAL RECTAL DAILY PRN
Status: DISCONTINUED | OUTPATIENT
Start: 2024-12-12 | End: 2024-12-13 | Stop reason: HOSPADM

## 2024-12-10 RX ORDER — CITRIC ACID/SODIUM CITRATE 334-500MG
30 SOLUTION, ORAL ORAL
Status: COMPLETED | OUTPATIENT
Start: 2024-12-10 | End: 2024-12-10

## 2024-12-10 RX ORDER — IBUPROFEN 800 MG/1
800 TABLET, FILM COATED ORAL EVERY 6 HOURS
Status: DISCONTINUED | OUTPATIENT
Start: 2024-12-11 | End: 2024-12-11

## 2024-12-10 RX ORDER — DEXAMETHASONE SODIUM PHOSPHATE 4 MG/ML
4 INJECTION, SOLUTION INTRA-ARTICULAR; INTRALESIONAL; INTRAMUSCULAR; INTRAVENOUS; SOFT TISSUE
Status: DISCONTINUED | OUTPATIENT
Start: 2024-12-10 | End: 2024-12-13 | Stop reason: HOSPADM

## 2024-12-10 RX ORDER — ONDANSETRON 4 MG/1
4 TABLET, ORALLY DISINTEGRATING ORAL EVERY 30 MIN PRN
Status: DISCONTINUED | OUTPATIENT
Start: 2024-12-10 | End: 2024-12-13 | Stop reason: HOSPADM

## 2024-12-10 RX ORDER — MAGNESIUM HYDROXIDE 1200 MG/15ML
LIQUID ORAL PRN
Status: DISCONTINUED | OUTPATIENT
Start: 2024-12-10 | End: 2024-12-13 | Stop reason: HOSPADM

## 2024-12-10 RX ORDER — CEFAZOLIN SODIUM/WATER 2 G/20 ML
SYRINGE (ML) INTRAVENOUS
Status: DISCONTINUED
Start: 2024-12-10 | End: 2024-12-10 | Stop reason: HOSPADM

## 2024-12-10 RX ORDER — MISOPROSTOL 200 UG/1
800 TABLET ORAL
Status: DISCONTINUED | OUTPATIENT
Start: 2024-12-10 | End: 2024-12-13 | Stop reason: HOSPADM

## 2024-12-10 RX ORDER — OXYTOCIN 10 [USP'U]/ML
10 INJECTION, SOLUTION INTRAMUSCULAR; INTRAVENOUS
Status: DISCONTINUED | OUTPATIENT
Start: 2024-12-10 | End: 2024-12-10 | Stop reason: HOSPADM

## 2024-12-10 RX ORDER — AMOXICILLIN 250 MG
2 CAPSULE ORAL 2 TIMES DAILY
Status: DISCONTINUED | OUTPATIENT
Start: 2024-12-10 | End: 2024-12-13 | Stop reason: HOSPADM

## 2024-12-10 RX ORDER — METHYLERGONOVINE MALEATE 0.2 MG/ML
200 INJECTION INTRAVENOUS
Status: DISCONTINUED | OUTPATIENT
Start: 2024-12-10 | End: 2024-12-13 | Stop reason: HOSPADM

## 2024-12-10 RX ORDER — DIPHENHYDRAMINE HYDROCHLORIDE 50 MG/ML
25 INJECTION INTRAMUSCULAR; INTRAVENOUS EVERY 6 HOURS PRN
Status: DISCONTINUED | OUTPATIENT
Start: 2024-12-10 | End: 2024-12-13 | Stop reason: HOSPADM

## 2024-12-10 RX ORDER — OXYTOCIN/0.9 % SODIUM CHLORIDE 30/500 ML
340 PLASTIC BAG, INJECTION (ML) INTRAVENOUS CONTINUOUS PRN
Status: DISCONTINUED | OUTPATIENT
Start: 2024-12-10 | End: 2024-12-13 | Stop reason: HOSPADM

## 2024-12-10 RX ORDER — HYDROMORPHONE HCL IN WATER/PF 6 MG/30 ML
0.2 PATIENT CONTROLLED ANALGESIA SYRINGE INTRAVENOUS EVERY 5 MIN PRN
Status: DISCONTINUED | OUTPATIENT
Start: 2024-12-10 | End: 2024-12-13 | Stop reason: HOSPADM

## 2024-12-10 RX ORDER — ONDANSETRON 2 MG/ML
4 INJECTION INTRAMUSCULAR; INTRAVENOUS EVERY 6 HOURS PRN
Status: DISCONTINUED | OUTPATIENT
Start: 2024-12-10 | End: 2024-12-13 | Stop reason: HOSPADM

## 2024-12-10 RX ORDER — ONDANSETRON 2 MG/ML
4 INJECTION INTRAMUSCULAR; INTRAVENOUS EVERY 30 MIN PRN
Status: DISCONTINUED | OUTPATIENT
Start: 2024-12-10 | End: 2024-12-13 | Stop reason: HOSPADM

## 2024-12-10 RX ORDER — LIDOCAINE 40 MG/G
CREAM TOPICAL
Status: DISCONTINUED | OUTPATIENT
Start: 2024-12-10 | End: 2024-12-13 | Stop reason: HOSPADM

## 2024-12-10 RX ORDER — LOPERAMIDE HYDROCHLORIDE 2 MG/1
2 CAPSULE ORAL
Status: DISCONTINUED | OUTPATIENT
Start: 2024-12-10 | End: 2024-12-13 | Stop reason: HOSPADM

## 2024-12-10 RX ORDER — OXYTOCIN/0.9 % SODIUM CHLORIDE 30/500 ML
PLASTIC BAG, INJECTION (ML) INTRAVENOUS CONTINUOUS PRN
Status: DISCONTINUED | OUTPATIENT
Start: 2024-12-10 | End: 2024-12-10

## 2024-12-10 RX ORDER — SODIUM CHLORIDE, SODIUM LACTATE, POTASSIUM CHLORIDE, CALCIUM CHLORIDE 600; 310; 30; 20 MG/100ML; MG/100ML; MG/100ML; MG/100ML
INJECTION, SOLUTION INTRAVENOUS CONTINUOUS
Status: DISCONTINUED | OUTPATIENT
Start: 2024-12-10 | End: 2024-12-10 | Stop reason: HOSPADM

## 2024-12-10 RX ORDER — LOPERAMIDE HYDROCHLORIDE 2 MG/1
4 CAPSULE ORAL
Status: DISCONTINUED | OUTPATIENT
Start: 2024-12-10 | End: 2024-12-10 | Stop reason: HOSPADM

## 2024-12-10 RX ORDER — HYDROCORTISONE 25 MG/G
CREAM TOPICAL 3 TIMES DAILY PRN
Status: DISCONTINUED | OUTPATIENT
Start: 2024-12-10 | End: 2024-12-13 | Stop reason: HOSPADM

## 2024-12-10 RX ORDER — OXYTOCIN/0.9 % SODIUM CHLORIDE 30/500 ML
100-340 PLASTIC BAG, INJECTION (ML) INTRAVENOUS CONTINUOUS PRN
Status: DISCONTINUED | OUTPATIENT
Start: 2024-12-10 | End: 2024-12-13 | Stop reason: HOSPADM

## 2024-12-10 RX ORDER — AMOXICILLIN 250 MG
1 CAPSULE ORAL 2 TIMES DAILY
Status: DISCONTINUED | OUTPATIENT
Start: 2024-12-10 | End: 2024-12-13 | Stop reason: HOSPADM

## 2024-12-10 RX ORDER — OXYTOCIN/0.9 % SODIUM CHLORIDE 30/500 ML
340 PLASTIC BAG, INJECTION (ML) INTRAVENOUS CONTINUOUS PRN
Status: DISCONTINUED | OUTPATIENT
Start: 2024-12-10 | End: 2024-12-10 | Stop reason: HOSPADM

## 2024-12-10 RX ORDER — DEXAMETHASONE SODIUM PHOSPHATE 10 MG/ML
INJECTION, SOLUTION INTRAMUSCULAR; INTRAVENOUS PRN
Status: DISCONTINUED | OUTPATIENT
Start: 2024-12-10 | End: 2024-12-10

## 2024-12-10 RX ORDER — CEFAZOLIN SODIUM/WATER 2 G/20 ML
2 SYRINGE (ML) INTRAVENOUS
Status: COMPLETED | OUTPATIENT
Start: 2024-12-10 | End: 2024-12-10

## 2024-12-10 RX ORDER — HYDROMORPHONE HCL IN WATER/PF 6 MG/30 ML
0.4 PATIENT CONTROLLED ANALGESIA SYRINGE INTRAVENOUS EVERY 5 MIN PRN
Status: DISCONTINUED | OUTPATIENT
Start: 2024-12-10 | End: 2024-12-13 | Stop reason: HOSPADM

## 2024-12-10 RX ORDER — SODIUM PHOSPHATE,MONO-DIBASIC 19G-7G/118
1 ENEMA (ML) RECTAL DAILY PRN
Status: DISCONTINUED | OUTPATIENT
Start: 2024-12-12 | End: 2024-12-13 | Stop reason: HOSPADM

## 2024-12-10 RX ORDER — FENTANYL CITRATE 50 UG/ML
INJECTION, SOLUTION INTRAMUSCULAR; INTRAVENOUS PRN
Status: DISCONTINUED | OUTPATIENT
Start: 2024-12-10 | End: 2024-12-10

## 2024-12-10 RX ORDER — PROCHLORPERAZINE MALEATE 10 MG
10 TABLET ORAL EVERY 6 HOURS PRN
Status: DISCONTINUED | OUTPATIENT
Start: 2024-12-10 | End: 2024-12-10 | Stop reason: HOSPADM

## 2024-12-10 RX ORDER — CEFAZOLIN SODIUM/WATER 2 G/20 ML
2 SYRINGE (ML) INTRAVENOUS SEE ADMIN INSTRUCTIONS
Status: DISCONTINUED | OUTPATIENT
Start: 2024-12-10 | End: 2024-12-10 | Stop reason: HOSPADM

## 2024-12-10 RX ORDER — MISOPROSTOL 200 UG/1
800 TABLET ORAL
Status: DISCONTINUED | OUTPATIENT
Start: 2024-12-10 | End: 2024-12-10 | Stop reason: HOSPADM

## 2024-12-10 RX ORDER — MORPHINE SULFATE 0.5 MG/ML
150 INJECTION, SOLUTION EPIDURAL; INTRATHECAL; INTRAVENOUS ONCE
Status: DISCONTINUED | OUTPATIENT
Start: 2024-12-10 | End: 2024-12-10 | Stop reason: HOSPADM

## 2024-12-10 RX ORDER — METOCLOPRAMIDE HYDROCHLORIDE 5 MG/ML
10 INJECTION INTRAMUSCULAR; INTRAVENOUS EVERY 6 HOURS PRN
Status: DISCONTINUED | OUTPATIENT
Start: 2024-12-10 | End: 2024-12-10 | Stop reason: HOSPADM

## 2024-12-10 RX ORDER — OXYCODONE HYDROCHLORIDE 5 MG/1
5 TABLET ORAL EVERY 4 HOURS PRN
Status: DISCONTINUED | OUTPATIENT
Start: 2024-12-10 | End: 2024-12-13 | Stop reason: HOSPADM

## 2024-12-10 RX ORDER — LOPERAMIDE HYDROCHLORIDE 2 MG/1
2 CAPSULE ORAL
Status: DISCONTINUED | OUTPATIENT
Start: 2024-12-10 | End: 2024-12-10 | Stop reason: HOSPADM

## 2024-12-10 RX ORDER — MORPHINE SULFATE 1 MG/ML
INJECTION, SOLUTION EPIDURAL; INTRATHECAL; INTRAVENOUS PRN
Status: DISCONTINUED | OUTPATIENT
Start: 2024-12-10 | End: 2024-12-10

## 2024-12-10 RX ORDER — ONDANSETRON 4 MG/1
4 TABLET, ORALLY DISINTEGRATING ORAL EVERY 6 HOURS PRN
Status: DISCONTINUED | OUTPATIENT
Start: 2024-12-10 | End: 2024-12-10 | Stop reason: HOSPADM

## 2024-12-10 RX ORDER — ONDANSETRON 4 MG/1
4 TABLET, ORALLY DISINTEGRATING ORAL EVERY 6 HOURS PRN
Status: DISCONTINUED | OUTPATIENT
Start: 2024-12-10 | End: 2024-12-13 | Stop reason: HOSPADM

## 2024-12-10 RX ORDER — CARBOPROST TROMETHAMINE 250 UG/ML
250 INJECTION, SOLUTION INTRAMUSCULAR
Status: DISCONTINUED | OUTPATIENT
Start: 2024-12-10 | End: 2024-12-13 | Stop reason: HOSPADM

## 2024-12-10 RX ORDER — ACETAMINOPHEN 325 MG/1
975 TABLET ORAL ONCE
Status: COMPLETED | OUTPATIENT
Start: 2024-12-10 | End: 2024-12-10

## 2024-12-10 RX ORDER — DIPHENHYDRAMINE HCL 25 MG
25 CAPSULE ORAL EVERY 6 HOURS PRN
Status: DISCONTINUED | OUTPATIENT
Start: 2024-12-10 | End: 2024-12-13 | Stop reason: HOSPADM

## 2024-12-10 RX ORDER — DEXTROSE, SODIUM CHLORIDE, SODIUM LACTATE, POTASSIUM CHLORIDE, AND CALCIUM CHLORIDE 5; .6; .31; .03; .02 G/100ML; G/100ML; G/100ML; G/100ML; G/100ML
INJECTION, SOLUTION INTRAVENOUS CONTINUOUS
Status: DISCONTINUED | OUTPATIENT
Start: 2024-12-10 | End: 2024-12-13 | Stop reason: HOSPADM

## 2024-12-10 RX ORDER — BUPIVACAINE HYDROCHLORIDE 2.5 MG/ML
INJECTION, SOLUTION EPIDURAL; INFILTRATION; INTRACAUDAL PRN
Status: DISCONTINUED | OUTPATIENT
Start: 2024-12-10 | End: 2024-12-10

## 2024-12-10 RX ORDER — METOCLOPRAMIDE 10 MG/1
10 TABLET ORAL EVERY 6 HOURS PRN
Status: DISCONTINUED | OUTPATIENT
Start: 2024-12-10 | End: 2024-12-10 | Stop reason: HOSPADM

## 2024-12-10 RX ORDER — NALOXONE HYDROCHLORIDE 0.4 MG/ML
0.1 INJECTION, SOLUTION INTRAMUSCULAR; INTRAVENOUS; SUBCUTANEOUS
Status: DISCONTINUED | OUTPATIENT
Start: 2024-12-10 | End: 2024-12-13 | Stop reason: HOSPADM

## 2024-12-10 RX ORDER — TRANEXAMIC ACID 10 MG/ML
1 INJECTION, SOLUTION INTRAVENOUS EVERY 30 MIN PRN
Status: DISCONTINUED | OUTPATIENT
Start: 2024-12-10 | End: 2024-12-13 | Stop reason: HOSPADM

## 2024-12-10 RX ORDER — CARBOPROST TROMETHAMINE 250 UG/ML
250 INJECTION, SOLUTION INTRAMUSCULAR
Status: DISCONTINUED | OUTPATIENT
Start: 2024-12-10 | End: 2024-12-10 | Stop reason: HOSPADM

## 2024-12-10 RX ORDER — ONDANSETRON 2 MG/ML
4 INJECTION INTRAMUSCULAR; INTRAVENOUS EVERY 6 HOURS PRN
Status: DISCONTINUED | OUTPATIENT
Start: 2024-12-10 | End: 2024-12-10 | Stop reason: HOSPADM

## 2024-12-10 RX ORDER — PROCHLORPERAZINE MALEATE 10 MG
10 TABLET ORAL EVERY 6 HOURS PRN
Status: DISCONTINUED | OUTPATIENT
Start: 2024-12-10 | End: 2024-12-13 | Stop reason: HOSPADM

## 2024-12-10 RX ORDER — MISOPROSTOL 200 UG/1
400 TABLET ORAL
Status: DISCONTINUED | OUTPATIENT
Start: 2024-12-10 | End: 2024-12-13 | Stop reason: HOSPADM

## 2024-12-10 RX ORDER — TRANEXAMIC ACID 10 MG/ML
1 INJECTION, SOLUTION INTRAVENOUS EVERY 30 MIN PRN
Status: DISCONTINUED | OUTPATIENT
Start: 2024-12-10 | End: 2024-12-10 | Stop reason: HOSPADM

## 2024-12-10 RX ORDER — KETOROLAC TROMETHAMINE 30 MG/ML
30 INJECTION, SOLUTION INTRAMUSCULAR; INTRAVENOUS EVERY 6 HOURS
Status: COMPLETED | OUTPATIENT
Start: 2024-12-10 | End: 2024-12-11

## 2024-12-10 RX ORDER — ACETAMINOPHEN 325 MG/1
975 TABLET ORAL EVERY 6 HOURS
Status: DISCONTINUED | OUTPATIENT
Start: 2024-12-10 | End: 2024-12-13 | Stop reason: HOSPADM

## 2024-12-10 RX ORDER — METOCLOPRAMIDE HYDROCHLORIDE 5 MG/ML
10 INJECTION INTRAMUSCULAR; INTRAVENOUS EVERY 6 HOURS PRN
Status: DISCONTINUED | OUTPATIENT
Start: 2024-12-10 | End: 2024-12-13 | Stop reason: HOSPADM

## 2024-12-10 RX ORDER — SODIUM CHLORIDE, SODIUM LACTATE, POTASSIUM CHLORIDE, CALCIUM CHLORIDE 600; 310; 30; 20 MG/100ML; MG/100ML; MG/100ML; MG/100ML
INJECTION, SOLUTION INTRAVENOUS CONTINUOUS
Status: DISCONTINUED | OUTPATIENT
Start: 2024-12-10 | End: 2024-12-13 | Stop reason: HOSPADM

## 2024-12-10 RX ORDER — ONDANSETRON 2 MG/ML
INJECTION INTRAMUSCULAR; INTRAVENOUS PRN
Status: DISCONTINUED | OUTPATIENT
Start: 2024-12-10 | End: 2024-12-10

## 2024-12-10 RX ADMIN — FENTANYL CITRATE 15 MCG: 50 INJECTION INTRAMUSCULAR; INTRAVENOUS at 14:10

## 2024-12-10 RX ADMIN — SENNOSIDES AND DOCUSATE SODIUM 1 TABLET: 50; 8.6 TABLET ORAL at 21:18

## 2024-12-10 RX ADMIN — ACETAMINOPHEN 975 MG: 325 TABLET ORAL at 19:06

## 2024-12-10 RX ADMIN — ONDANSETRON 4 MG: 2 INJECTION INTRAMUSCULAR; INTRAVENOUS at 14:16

## 2024-12-10 RX ADMIN — KETOROLAC TROMETHAMINE 15 MG: 30 INJECTION, SOLUTION INTRAMUSCULAR at 14:48

## 2024-12-10 RX ADMIN — SODIUM CITRATE AND CITRIC ACID MONOHYDRATE 30 ML: 500; 334 SOLUTION ORAL at 13:02

## 2024-12-10 RX ADMIN — PHENYLEPHRINE HYDROCHLORIDE 0.5 MCG/KG/MIN: 10 INJECTION INTRAVENOUS at 14:05

## 2024-12-10 RX ADMIN — Medication 600 ML/HR: at 14:27

## 2024-12-10 RX ADMIN — KETOROLAC TROMETHAMINE 30 MG: 30 INJECTION, SOLUTION INTRAMUSCULAR at 21:18

## 2024-12-10 RX ADMIN — BUPIVACAINE HYDROCHLORIDE 30 ML: 2.5 INJECTION, SOLUTION EPIDURAL; INFILTRATION; INTRACAUDAL at 14:56

## 2024-12-10 RX ADMIN — SODIUM CHLORIDE, POTASSIUM CHLORIDE, SODIUM LACTATE AND CALCIUM CHLORIDE: 600; 310; 30; 20 INJECTION, SOLUTION INTRAVENOUS at 14:32

## 2024-12-10 RX ADMIN — Medication 150 MCG: at 14:10

## 2024-12-10 RX ADMIN — DEXAMETHASONE SODIUM PHOSPHATE 10 MG: 10 INJECTION, SOLUTION INTRAMUSCULAR; INTRAVENOUS at 14:17

## 2024-12-10 RX ADMIN — BUPIVACAINE 20 ML: 13.3 INJECTION, SUSPENSION, LIPOSOMAL INFILTRATION at 14:56

## 2024-12-10 RX ADMIN — SODIUM CHLORIDE, POTASSIUM CHLORIDE, SODIUM LACTATE AND CALCIUM CHLORIDE: 600; 310; 30; 20 INJECTION, SOLUTION INTRAVENOUS at 12:10

## 2024-12-10 RX ADMIN — ACETAMINOPHEN 975 MG: 325 TABLET ORAL at 13:02

## 2024-12-10 RX ADMIN — Medication 2 G: at 13:56

## 2024-12-10 ASSESSMENT — ACTIVITIES OF DAILY LIVING (ADL)
ADLS_ACUITY_SCORE: 15
ADLS_ACUITY_SCORE: 15
ADLS_ACUITY_SCORE: 41
ADLS_ACUITY_SCORE: 15
ADLS_ACUITY_SCORE: 15
ADLS_ACUITY_SCORE: 18
ADLS_ACUITY_SCORE: 15
ADLS_ACUITY_SCORE: 17
ADLS_ACUITY_SCORE: 17
ADLS_ACUITY_SCORE: 15
ADLS_ACUITY_SCORE: 15
ADLS_ACUITY_SCORE: 18
ADLS_ACUITY_SCORE: 15

## 2024-12-10 NOTE — PROGRESS NOTES
Called to OR following C section. Baby was already out and at warmer for 5 min upon arrival. I assumed CPAP of 5, increased oxygen to 40%, SAT 77%. Baby responded well. NP suctioned returning moderate amount of clear secretions. Remained on CPAP 5 for 25 minutes in my care and was able to titrate to 21%. SAT on RA 92-96%. Placed with mom for skin to skin. Will follow as needed.

## 2024-12-10 NOTE — ANESTHESIA PREPROCEDURE EVALUATION
Office Consent to Operation/Invasive Procedure    Name: Brooklynn Kulkarni   YOB: 1974   MRN: [id number]   AUTHORIZATION:  I authorize performance on the patient of the following surgical operation/invasive procedure under the direction, supervision and authority of Jair Barkley MD.  Description of operation(s): ______________________________    NATURE OF TREATMENT:  The nature of my condition, the nature and purpose of the operation/procedure, possible alternative methods of treatment, risks involved, and the possibility of complications have been explained to me. I have had an opportunity to discuss this operation/procedure with the physician and other doctors concerned, and I have received answers to all questions I asked and do hereby assume all risks involved. No guarantee or assurance has been given to me by anyone as to the results that may be obtained.     ADDITIONAL PROCEDURES:  I consent to the performance of operations and procedures in addition to or different from those now contemplated, whether or not arising from presently unforeseen conditions, which the above-named doctor or his/her associate or assistants may consider necessary or advisable in the course of the operation.    OTHER QUALIFIED PRACTITIONERS:  I have been advised, and I agree, that the operation/procedure may be performed by a team of doctors including one or more attending, doctors, residents and medical students. I consent to these other qualified medical practitioners, who are not physicians, performing important parts of the operation/procedure or the administration of anesthetic agents.    ANESTHETIC AGENTS: I understand that anesthetic agents may have serious and even fatal complications. I consent to the administration of such anesthetics/sedatives/medications as may be considered necessary or advisable by the physician responsible for the service.      OBSERVATION:  For the purpose of advancing the  Anesthesia Pre-Procedure Evaluation    Patient: Kay Vieyra   MRN: [id number] : 2000        Procedure : Procedure(s):  PRIMARY  SECTION          No past medical history on file.   Past Surgical History:   Procedure Laterality Date    LAPAROSCOPY DIAGNOSTIC (GYN) N/A 2023    Procedure: LAPAROSCOPY FOR ECTOPIC, LAPAROSCOPIC LEFT SALPINGECTOMY, TUBAL DYE STUDY, EVACUATION OF HEMOPERITONEUM;  Surgeon: Yojana Nesbitt DO;  Location: [facility] OR      Allergies   Allergen Reactions    Sumatriptan Anaphylaxis      Social History     Tobacco Use    Smoking status: Never    Smokeless tobacco: Never   Substance Use Topics    Alcohol use: Never      Wt Readings from Last 1 Encounters:   12/10/24 98.4 kg (217 lb)        Anesthesia Evaluation            ROS/MED HX  ENT/Pulmonary:  - neg pulmonary ROS     Neurologic:  - neg neurologic ROS     Cardiovascular:     (+)  - - PIH  -  - -                                      METS/Exercise Tolerance: >4 METS    Hematologic:  - neg hematologic  ROS     Musculoskeletal:  - neg musculoskeletal ROS     GI/Hepatic:     (+) GERD,                   Renal/Genitourinary:       Endo:     (+)               Obesity,       Psychiatric/Substance Use:  - neg psychiatric ROS     Infectious Disease:    (-) Recent Fever   Malignancy:       Other:            Physical Exam    Airway        Mallampati: IV   TM distance: > 3 FB   Neck ROM: full   Mouth opening: > 3 cm    Respiratory Devices and Support         Dental       (+) Minor Abnormalities - some fillings, tiny chips      Cardiovascular          Rhythm and rate: regular     Pulmonary           breath sounds clear to auscultation           OUTSIDE LABS:  CBC:   Lab Results   Component Value Date    WBC 10.7 2024    WBC 8.1 2023    HGB 11.5 (L) 12/10/2024    HGB 12.2 2024    HCT 35.0 2024    HCT 38.0 2023     (L) 12/10/2024     (L) 2024     BMP:   Lab Results  "  Component Value Date     12/02/2024     01/11/2023    POTASSIUM 3.9 12/02/2024    POTASSIUM 3.6 01/11/2023    CHLORIDE 105 12/02/2024    CHLORIDE 102 01/11/2023    CO2 19 (L) 12/02/2024    CO2 26 01/11/2023    BUN 7.9 12/02/2024    BUN 6.4 01/11/2023    CR 0.55 12/02/2024    CR 0.63 01/11/2023    GLC 83 12/02/2024    GLC 93 01/11/2023     COAGS: No results found for: \"PTT\", \"INR\", \"FIBR\"  POC: No results found for: \"BGM\", \"HCG\", \"HCGS\"  HEPATIC:   Lab Results   Component Value Date    ALBUMIN 3.6 12/02/2024    PROTTOTAL 5.9 (L) 12/02/2024    ALT 14 12/02/2024    AST 19 12/02/2024    ALKPHOS 146 12/02/2024    BILITOTAL 0.5 12/02/2024     OTHER:   Lab Results   Component Value Date    MAIN 9.0 12/02/2024    TSH 1.50 11/12/2018       Anesthesia Plan    ASA Status:  2    NPO Status:  NPO Appropriate    Anesthesia Type: Spinal.   Induction: N/a.   Maintenance: N/A.        Consents    Anesthesia Plan(s) and associated risks, benefits, and realistic alternatives discussed. Questions answered and patient/representative(s) expressed understanding.     - Discussed:     - Discussed with:  Patient            Postoperative Care    Pain management: Peripheral nerve block (Single Shot), intrathecal morphine.   PONV prophylaxis: Ondansetron (or other 5HT-3), Dexamethasone or Solumedrol     Comments:    Other Comments:     SAB with marcaine and fentanyl 15 mcg  PF morphine 150 mcg  Ebenezer gtt in line and started at 0.2 mcg/kg/min  TAP blocks for post-op pain as requested by surgeon  Toradol if ok with surgeon - 15 mg               Shanice Martinez MD                                     " educational programs of the facility I consent to the admittance of qualified observers.    PHOTOGRAPHY: I consent to the taking and publication of photographs and other reproductions in the course of the operation or procedure for the purpose of advancing education provided that the identify of the patient is not revealed.    TISSUE USE:  I authorize the facility to preserve and use for scientific or teaching purposes or otherwise dispose of any dismembered tissue resulting from the procedure and treatment authorized above.    INDEPENDENT PHYSICIAN SERVICES: I have been informed and I acknowledge and fully understand that the Physician(s) providing services to me in this facility, such as my personal Physician(s),  Specialty Physician(s), Radiology, Pathology, consulting, and other allied health physicians, are independent contractors and are not employees or agents of this facility, unless otherwise specifically stated. My decision to seek medical care at the facility is not based upon any understanding, representation, advertisement, media campaign, inference, implication or reliance that the physicians who are or will be treating me are employees or agents of the facility.     DO NOT SIGN IF YOU HAVE ANY QUESTIONS  ________________________________________________________________________________________________________________  My signature below constitutes my acknowledgement and agreement that I have read and understand the above, was given an opportunity to discuss this form and ask questions, that all questions were answered to my satisfaction, and I am satisfied I understand the form's contents and significance.    Date: __________________ Time: _________________ Signed: ______________________________________________________        Patient/Legally Authorized Representative ([place] [facility] appropriate)     Date: __________________   Time: _________________   Signed:  ______________________________________________________  Witness   Certificate of Interpretation:  I certify that I have read the foregoing to the signor hearof in the ____________________________________________________ language.     Date: ________________ Time: __________________ Signed: _________________________________________________________     Affirmation by Responsible Physician  I have informed the above named patient or Legally Authorized Representative of the medical condition requiring surgical treatment and/or the further diagnostic procedures referred to above. I have explained, consistent with accepted medical judgment, the nature and purposes of the treatment or procedures, the reasonable (1) possible alternatives (2) risks/complications and (3) consequences in the treatment or procedure consented to. I have also given the opportunity to ask questions and have answered any such questions.     Date:________________ Time: __________________ Signed: _________________________________________________________

## 2024-12-10 NOTE — H&P
L&D History and Physical   December 10, 2024  Kay Vieyra  [id number]      HPI: Kay Vieyra is a 24 year old  at 37w3d who presents today for an elective primary CS in a pregnancy complicated by gHTN. She has been experiencing intermittent bilateral temporal headaches, significant LE edema, recent HELLP labs and P:C was normal. Normotensive on arrival and feeling overall well.     OBHX:   OB History    Para Term  AB Living   2 0 0 0 0 0   SAB IAB Ectopic Multiple Live Births   0 0 0 0 0      # Outcome Date GA Lbr Kun/2nd Weight Sex Type Anes PTL Lv   2 Current            1                 MedicalHX:   No past medical history on file.    SurgicalHX:   Past Surgical History:   Procedure Laterality Date    LAPAROSCOPY DIAGNOSTIC (GYN) N/A 2023    Procedure: LAPAROSCOPY FOR ECTOPIC, LAPAROSCOPIC LEFT SALPINGECTOMY, TUBAL DYE STUDY, EVACUATION OF HEMOPERITONEUM;  Surgeon: Yojana Nesbitt DO;  Location: [facility] OR       Medications:   Current Facility-Administered Medications   Medication Dose Route Frequency Provider Last Rate Last Admin    acetaminophen (TYLENOL) tablet 975 mg  975 mg Oral Once Markel Vicente MD        BUPivacaine liposome (EXPAREL) 1.3 % LA inj susp 20 mL  20 mL Infiltration DURING SURGERY Shanice Martinez MD        carboprost (HEMABATE) injection 250 mcg  250 mcg Intramuscular Q15 Min PRN Markel Vicente MD        And    loperamide (IMODIUM) capsule 4 mg  4 mg Oral Once PRN Markel Vicente MD        ceFAZolin Sodium (ANCEF) injection 2 g  2 g Intravenous Pre-Op/Pre-procedure x 1 dose Markel Vicente MD        ceFAZolin Sodium (ANCEF) injection 2 g  2 g Intravenous See Admin Instructions Markel Vicente MD        lactated ringers BOLUS 500 mL  500 mL Intravenous Once Markel Vicente MD        lactated ringers infusion   Intravenous Continuous Markel Vicente MD        lactated ringers  infusion   Intravenous Continuous Shanice Martinez MD        lidocaine (LMX4) cream   Topical Q1H PRN Markel Vicente MD        lidocaine (LMX4) cream   Topical Q1H PRN Shanice Martinez MD        lidocaine 1 % 0.1-1 mL  0.1-1 mL Other Q1H PRN Markel Vicente MD        lidocaine 1 % 0.1-1 mL  0.1-1 mL Other Q1H PRN Shanice Martinez MD        loperamide (IMODIUM) capsule 2 mg  2 mg Oral Q2H PRN Markel Vicente MD        misoprostol (CYTOTEC) tablet 400 mcg  400 mcg Oral ONCE PRN REPEAT PER INSTRUCTIONS Markel Vicente MD        Or    misoprostol (CYTOTEC) tablet 800 mcg  800 mcg Rectal ONCE PRN REPEAT PER INSTRUCTIONS Markel Vicente MD        oxytocin (PITOCIN) 30 units in 500 mL 0.9% NaCl infusion  340 mL/hr Intravenous Continuous PRN Markel Vicente MD        oxytocin (PITOCIN) injection 10 Units  10 Units Intramuscular Once PRN Markel Vicente MD        sodium chloride (PF) 0.9% PF flush 3 mL  3 mL Intracatheter Q8H Markel Vicente MD        sodium chloride (PF) 0.9% PF flush 3 mL  3 mL Intracatheter q1 min prn Markel Vicente MD        sodium chloride (PF) 0.9% PF flush 3 mL  3 mL Intracatheter Q8H Shanice Martinez MD        sodium chloride (PF) 0.9% PF flush 3 mL  3 mL Intracatheter q1 min prn Shanice Martinez MD        sodium citrate-citric acid (BICITRA) solution 30 mL  30 mL Oral Pre-Op/Pre-procedure x 1 dose Markel Vicente MD        tranexamic acid 1 g in 100 mL NS IV bag (premix)  1 g Intravenous Q30 Min PRN Markel Vicente MD           Allergies:  Allergies   Allergen Reactions    Sumatriptan Anaphylaxis       FamilyHX:  No family history on file.    SocialHX:   Social History     Socioeconomic History    Marital status: Single   Tobacco Use    Smoking status: Never    Smokeless tobacco: Never   Substance and Sexual Activity    Alcohol use: Never    Drug use: Never    Sexual  "activity: Yes     Partners: Male     Comment: Pill.      Social Drivers of Health     Financial Resource Strain: Low Risk  (12/10/2024)    Financial Resource Strain     Within the past 12 months, have you or your family members you live with been unable to get utilities (heat, electricity) when it was really needed?: No   Food Insecurity: Low Risk  (12/10/2024)    Food Insecurity     Within the past 12 months, did you worry that your food would run out before you got money to buy more?: No     Within the past 12 months, did the food you bought just not last and you didn t have money to get more?: No   Transportation Needs: Low Risk  (12/10/2024)    Transportation Needs     Within the past 12 months, has lack of transportation kept you from medical appointments, getting your medicines, non-medical meetings or appointments, work, or from getting things that you need?: No   Interpersonal Safety: High Risk (12/2/2024)    Interpersonal Safety     Do you feel physically and emotionally safe where you currently live?: No     Within the past 12 months, have you been hit, slapped, kicked or otherwise physically hurt by someone?: No     Within the past 12 months, have you been humiliated or emotionally abused in other ways by your partner or ex-partner?: No   Housing Stability: Low Risk  (12/10/2024)    Housing Stability     Do you have housing? : Yes     Are you worried about losing your housing?: No       ROS: 10 point ROS negative other than above    Physical Exam:  Patient Vitals for the past 24 hrs:   BP Temp Temp src Pulse Resp Height Weight   12/10/24 1043 133/78 98.6  F (37  C) Oral 100 16 1.676 m (5' 6\") 98.4 kg (217 lb)     General: Appropriately interactive, NAD, appears generally well  CV: RRR  Lungs: CTAB, non-labored breathing  Abdomen: soft, gravid, non-tender  SVE:  deferred   Extremities: Non-tender with 2+ pitting edema bilaterally in LE    FHT: 145, moderate variability, accels present, no decels   [place]: " quiet     Assessment: 24 year old  at 37w3d who presents today for an elective primary CS in a pregnancy complicated by gHTN.    Plan:  - Admit to labor and delivery  - FWB:  Cat 1 tracing, reactive  - Will proceed with planned CS    Markel Vicente MD  OB/GYN  December 10, 2024  11:58 AM

## 2024-12-10 NOTE — ANESTHESIA CARE TRANSFER NOTE
Patient: Kay Vieyra    Procedure: Procedure(s):  PRIMARY  SECTION       Diagnosis: Gestational HTN [O13.9]  Diagnosis Additional Information: No value filed.    Anesthesia Type:   Spinal     Note:    Oropharynx: oropharynx clear of all foreign objects and spontaneously breathing  Level of Consciousness: awake  Oxygen Supplementation: room air    Independent Airway: airway patency satisfactory and stable  Dentition: dentition unchanged  Vital Signs Stable: post-procedure vital signs reviewed and stable  Report to RN Given: handoff report given  Patient transferred to: Labor and Delivery    Handoff Report: Identifed the Patient, Identified the Reponsible Provider, Reviewed the pertinent medical history, Discussed the surgical course, Reviewed Intra-OP anesthesia mangement and issues during anesthesia, Set expectations for post-procedure period and Allowed opportunity for questions and acknowledgement of understanding      Vitals:  Vitals Value Taken Time   /59 12/10/24 1508   Temp 36.5  C (97.7  F) 12/10/24 1508   Pulse 94 12/10/24 1508   Resp 16 12/10/24 1508   SpO2 99 % 12/10/24 1508       Electronically Signed By: AYAKA MORELAND CRNA  December 10, 2024  3:09 PM

## 2024-12-10 NOTE — ANESTHESIA PROCEDURE NOTES
"Intrathecal injection Procedure Note    Pre-Procedure   Staff -        Anesthesiologist:  Shanice Martinez MD       Performed By: anesthesiologist       Location: OR       Procedure Start/Stop Times: 12/10/2024 2:03 PM and 12/10/2024 2:10 PM       Pre-Anesthestic Checklist: patient identified, IV checked, risks and benefits discussed, informed consent, monitors and equipment checked, pre-op evaluation and at physician/surgeon's request  Timeout:       Correct Patient: Yes        Correct Procedure: Yes        Correct Site: Yes        Correct Position: Yes   Procedure Documentation  Procedure: intrathecal injection       Patient Position: sitting       Patient Prep/Sterile Barriers: sterile gloves, mask, patient draped       Skin prep: Chloraprep       Insertion Site: L3-4. (midline approach).       Needle Gauge: 25.        Needle Length (Inches): 3.5        Spinal Needle Type: Pencan       Introducer used       # of attempts: 1 and  # of redirects:     Assessment/Narrative         Paresthesias: No.       CSF fluid: clear.    Medication(s) Administered   Medication Administration Time: 12/10/2024 2:03 PM      FOR [facility] ([facility]) ONLY:   Pain Team Contact information: please page the Pain Team Via [employer]. Search \"Pain\". During daytime hours, please page the attending first. At night please page the resident first.      "

## 2024-12-10 NOTE — OP NOTE
[place]  Operative Note    Name: Kay Vieyra  MRN: [id number]  : 2000  Date of Surgery: 12/10/2024    Pre-operative Diagnosis:   24 year old  at 37w3d   Desires elective primary CS  gHTN    Post-operative Diagnosis: Same, female    Procedure(s):   Primary lower transverse  via Pfannenstiel with two layer uterine closure  Surgeon: Markel Vicente MD    Anesthesia: Spinal  EBL: 521 mL     Specimens: Cord blood  Complications: None apparent.  Findings: Viable infant delivered at 1425 on December 10, 2024 with APGARs 7 & 7, and weight 3020g. Normal uterus, tubes, and ovaries. No adhesions.     Indications: Kay Vieyra was admitted as a 24 year old  at 37w3d who presented for a scheduled elective primary  in the setting of gestational HTN. Risks and benefits were reviewed and the patient consented for the procedure.      Procedure Details: The patient was taken back to the operating room where she underwent spinal anesthesia. She was prepped and draped in the usual sterile fashion in the dorsal supine position with a leftward tilt. A safety patient time out was performed. 2 gm Ancef was administered. A Pfannenstiel skin incision was made with the scalpel and carried through the underlying layer to the fascia. The fascia was incised in the midline and extended laterally with blunt dissection. The superior aspect of the fascial incision was grasped with kocher clamps, elevated and the underlying rectus muscles dissected off with a combination of blunt and sharp dissection. Attention was then turned to the inferior aspect of the incision, which in a similar fashion was grasped, tented up and the rectus muscle dissected off the fascia. The rectus muscles were  in the midline. The peritoneum was identified and entered bluntly. This was extended with blunt dissection and cautery. The Juanito O was inserted. The lower uterine segment was incised in a transverse fashion  with the scalpel. The incision was extended digitally. The infant that was in vertex position was delivered atraumatically. The cord was clamped and cut and the infant was handed off to the waiting staff.  A segment of cord was taken for cord gases. The placenta was removed with gentle traction on the cord and fundal message.     The uterus was cleared of all clots and debris. The uterine incision was repaired with 0 V-loc in a running fashion. A second layer of this same suture was used to imbricate the incision.    The uterus was noted to be hemostatic. The gutters were cleared of clots. A kocher clamp was used to elevated the fascia superiorly and inferiorly and good hemostasis was noted. The fascia was closed with 0-vicryl in a running fashion. The subcutaneous tissue was irrigated and areas of bleeding were controlled with cautery. The subcutaneous tissue was closed with 2-0 plain. The skin was closed with 3-0 Vicryl. The patient tolerated the procedure well and was taken to the recovery room in stable condition. All lap, instrument, and sharps counts were correct times two.      Markel Vicente MD  12/10/2024 2:51 PM

## 2024-12-10 NOTE — ANESTHESIA PROCEDURE NOTES
"TAP Procedure Note    Pre-Procedure   Staff -        Anesthesiologist:  Shanice Martinez MD       Performed By: anesthesiologist       Location: OR       Procedure Start/Stop Times: 12/10/2024 1:45 AM and 12/10/2024 2:56 PM       Pre-Anesthestic Checklist: patient identified, IV checked, site marked, risks and benefits discussed, informed consent, monitors and equipment checked, pre-op evaluation, at physician/surgeon's request and post-op pain management  Timeout:       Correct Patient: Yes        Correct Procedure: Yes        Correct Site: Yes        Correct Position: Yes        Correct Laterality: Yes        Site Marked: Yes  Procedure Documentation  Procedure: TAP       Laterality: bilateral       Patient Position: supine       Patient Prep/Sterile Barriers: sterile gloves, mask       Skin prep: Chloraprep       Needle Gauge: 20.        Needle Length (Inches): 4        Ultrasound guided       1. Ultrasound was used to identify targeted nerve, plexus, vascular marker, or fascial plane and place a needle adjacent to it in real-time.       2. Ultrasound was used to visualize the spread of anesthetic in close proximity to the above referenced structure.       3. A permanent image is entered into the patient's record.       4. The visualized anatomic structures appeared normal.       5. There were no apparent abnormal pathologic findings.    Assessment/Narrative         The placement was negative for: blood aspirated and painful injection       Paresthesias: No.       Bolus given via needle..        Secured via.        Insertion/Infusion Method: Single Shot       Complications: none    Medication(s) Administered   Medication Administration Time: 12/10/2024 1:45 AM      FOR [facility] ([facility] [place] [place]) ONLY:   Pain Team Contact information: please page the Pain Team Via [employer] [facility]. Search \"Pain\". During daytime hours, please page the attending first. At night please page the resident first.      "

## 2024-12-11 LAB — HGB BLD-MCNC: 9.9 G/DL (ref 11.7–15.7)

## 2024-12-11 PROCEDURE — 85018 HEMOGLOBIN: CPT | Performed by: OBSTETRICS & GYNECOLOGY

## 2024-12-11 PROCEDURE — 36415 COLL VENOUS BLD VENIPUNCTURE: CPT | Performed by: OBSTETRICS & GYNECOLOGY

## 2024-12-11 PROCEDURE — 250N000013 HC RX MED GY IP 250 OP 250 PS 637: Performed by: OBSTETRICS & GYNECOLOGY

## 2024-12-11 PROCEDURE — 250N000011 HC RX IP 250 OP 636: Performed by: OBSTETRICS & GYNECOLOGY

## 2024-12-11 PROCEDURE — 120N000013 HC R&B IMCU

## 2024-12-11 RX ORDER — KETOROLAC TROMETHAMINE 30 MG/ML
30 INJECTION, SOLUTION INTRAMUSCULAR; INTRAVENOUS EVERY 6 HOURS PRN
Status: DISCONTINUED | OUTPATIENT
Start: 2024-12-11 | End: 2024-12-13 | Stop reason: HOSPADM

## 2024-12-11 RX ADMIN — SENNOSIDES AND DOCUSATE SODIUM 2 TABLET: 50; 8.6 TABLET ORAL at 10:59

## 2024-12-11 RX ADMIN — ACETAMINOPHEN 975 MG: 325 TABLET ORAL at 19:37

## 2024-12-11 RX ADMIN — OXYCODONE 5 MG: 5 TABLET ORAL at 15:32

## 2024-12-11 RX ADMIN — ACETAMINOPHEN 975 MG: 325 TABLET ORAL at 14:01

## 2024-12-11 RX ADMIN — KETOROLAC TROMETHAMINE 30 MG: 30 INJECTION, SOLUTION INTRAMUSCULAR at 19:07

## 2024-12-11 RX ADMIN — KETOROLAC TROMETHAMINE 30 MG: 30 INJECTION, SOLUTION INTRAMUSCULAR at 10:59

## 2024-12-11 RX ADMIN — ACETAMINOPHEN 975 MG: 325 TABLET ORAL at 07:51

## 2024-12-11 RX ADMIN — OXYCODONE 5 MG: 5 TABLET ORAL at 19:37

## 2024-12-11 RX ADMIN — SENNOSIDES AND DOCUSATE SODIUM 2 TABLET: 50; 8.6 TABLET ORAL at 22:01

## 2024-12-11 RX ADMIN — ACETAMINOPHEN 975 MG: 325 TABLET ORAL at 01:44

## 2024-12-11 RX ADMIN — KETOROLAC TROMETHAMINE 30 MG: 30 INJECTION, SOLUTION INTRAMUSCULAR at 03:29

## 2024-12-11 ASSESSMENT — ACTIVITIES OF DAILY LIVING (ADL)
ADLS_ACUITY_SCORE: 17
ADLS_ACUITY_SCORE: 18
ADLS_ACUITY_SCORE: 17

## 2024-12-11 NOTE — PLAN OF CARE
Goal Outcome Evaluation:             VSS. Taking toradol and tylenol for pain. Up independently, voiding without difficulty.  Visited  in special care nursery.            Problem: Adult Inpatient Plan of Care  Goal: Optimal Comfort and Wellbeing  Outcome: Progressing     Problem: Postpartum ( Delivery)  Goal: Successful Parent Role Transition  Outcome: Progressing  Goal: Optimal Pain Control and Function  Outcome: Progressing  Goal: Effective Urinary Elimination  Outcome: Progressing

## 2024-12-11 NOTE — PROGRESS NOTES
G/P: 2/1  Delivery Date/Time: 12/10/24 at 1425 .   Method of Delivery: C/S by Dr. Vicente   QBL: 601    VS: WDL  Assessments: WDL   Incision closure with liquid bandage  Fundus firm 1 below the U, bleeding scant   Medications: Pitocin complete. Toradol given in OR. Pt due for acetaminophen @1900   Support Person: Raghavendra at bedside attentive to patient   Pt pumping at this time. RN to store maternal expressed milk in nursery.     Pt recovering well from surgery. No concerns at this time.     Katherin Romero RN

## 2024-12-11 NOTE — PROGRESS NOTES
"Postpartum Day 1:     Subjective:  The patient feels well. The patient has no emotional concerns. Pain is well controlled with current medications. The patient is ambulating well. She is voiding and passing gas.The amount and color of the lochia is appropriate for the duration of recovery, patient denies clots. The baby is well.    Objective   The patient has a blood pressure which is within the normal range. Urinary output is adequate.     Exam:   /56 (BP Location: Right arm, Patient Position: Semi-Gonzales's, Cuff Size: Adult Regular)   Pulse 76   Temp 98.1  F (36.7  C) (Oral)   Resp 16   Ht 1.676 m (5' 6\")   Wt 98.4 kg (217 lb)   SpO2 100%   Breastfeeding Unknown   BMI 35.02 kg/m    General: NAD, alert and orientated   Abdomen: soft, NT   Fundus: firm, nontender  Incision: covered, C/D/I  Ext: no pain     Lab Results   Component Value Date    HGB 9.9 (L) 2024       Impression:   Normal postpartum course, POD#1 LTCS secondary to GHTN and desire for elective     Plan:   - continue oral pain meds  - encourage ambulation   - Continue current care.      Susu Matute MD on 2024 at 11:43 AM    "

## 2024-12-12 VITALS
RESPIRATION RATE: 16 BRPM | OXYGEN SATURATION: 99 % | WEIGHT: 217 LBS | DIASTOLIC BLOOD PRESSURE: 85 MMHG | BODY MASS INDEX: 34.87 KG/M2 | TEMPERATURE: 98.6 F | HEIGHT: 66 IN | SYSTOLIC BLOOD PRESSURE: 136 MMHG | HEART RATE: 104 BPM

## 2024-12-12 PROCEDURE — 250N000013 HC RX MED GY IP 250 OP 250 PS 637: Performed by: OBSTETRICS & GYNECOLOGY

## 2024-12-12 PROCEDURE — 120N000013 HC R&B IMCU

## 2024-12-12 RX ADMIN — ACETAMINOPHEN 975 MG: 325 TABLET ORAL at 20:05

## 2024-12-12 RX ADMIN — SENNOSIDES AND DOCUSATE SODIUM 1 TABLET: 50; 8.6 TABLET ORAL at 20:05

## 2024-12-12 RX ADMIN — ACETAMINOPHEN 975 MG: 325 TABLET ORAL at 02:09

## 2024-12-12 RX ADMIN — ACETAMINOPHEN 975 MG: 325 TABLET ORAL at 08:02

## 2024-12-12 RX ADMIN — OXYCODONE 5 MG: 5 TABLET ORAL at 11:43

## 2024-12-12 RX ADMIN — ACETAMINOPHEN 975 MG: 325 TABLET ORAL at 14:14

## 2024-12-12 RX ADMIN — SENNOSIDES AND DOCUSATE SODIUM 1 TABLET: 50; 8.6 TABLET ORAL at 08:02

## 2024-12-12 ASSESSMENT — ACTIVITIES OF DAILY LIVING (ADL)
ADLS_ACUITY_SCORE: 17
ADLS_ACUITY_SCORE: 22
ADLS_ACUITY_SCORE: 17
ADLS_ACUITY_SCORE: 22
ADLS_ACUITY_SCORE: 17
ADLS_ACUITY_SCORE: 26
ADLS_ACUITY_SCORE: 26
ADLS_ACUITY_SCORE: 17
ADLS_ACUITY_SCORE: 22
ADLS_ACUITY_SCORE: 26
ADLS_ACUITY_SCORE: 17
ADLS_ACUITY_SCORE: 17
ADLS_ACUITY_SCORE: 26
ADLS_ACUITY_SCORE: 17
ADLS_ACUITY_SCORE: 17
ADLS_ACUITY_SCORE: 22
ADLS_ACUITY_SCORE: 22
ADLS_ACUITY_SCORE: 26
ADLS_ACUITY_SCORE: 22
ADLS_ACUITY_SCORE: 22

## 2024-12-12 NOTE — PROGRESS NOTES
IV infiltrated when RN administered toradol. IV removed. Pt requesting oxycodone when it can be given next.     Katherin Romero RN

## 2024-12-12 NOTE — PROGRESS NOTES
"Postpartum Progress Note  Kay Vieyra  [id number]    Subjective:   Patient is feeling well.  Lochia like menses.  Eating and drinking regular food without nausea/emesis.  Ambulating without difficulty, lightheadedness.  Pain is adequately controlled on orals.  Pumping without concerns/questions.  Voiding without difficulty.    Objective:  /67 (BP Location: Right arm, Patient Position: Semi-Gonzales's, Cuff Size: Adult Regular)   Pulse 82   Temp 97.8  F (36.6  C) (Oral)   Resp 16   Ht 1.676 m (5' 6\")   Wt 98.4 kg (217 lb)   SpO2 97%   Breastfeeding Unknown   BMI 35.02 kg/m      No intake/output data recorded.    General: appropriately interactive, NAD, comfortable  Heart: regular rate, extremities warm and well-perfused  Lungs: breathing comfortably, symmetric chest rise  Abdomen: Soft, non-tender, non-distended; fundus firm and at 3cm below  umbilicus  Incision:  Clean, dry, intact, no surrounding erythema/fluctuance  Extremities: 2+ pitting edema in BLE    Labs:  Hemoglobin   Date Value Ref Range Status   2024 9.9 (L) 11.7 - 15.7 g/dL Final     Hemoglobin   Date Value Ref Range Status   2024 9.9 (L) 11.7 - 15.7 g/dL Final   12/10/2024 11.5 (L) 11.7 - 15.7 g/dL Final       Assessment/Plan: 24 year old  who is POD#2 s/p PLTCS.  Currently stable and doing well.    - Continue routine post-op cares.     - Heme:    Hgb 11.5 >> 9.9  - Baby:    In NICU for respiratory support, anticipate return to room today  - gHTN:   Currently normotensive    Dispo: d/c to home likely tomorrow     Markel Vicente MD  2024, 9:05 AM   "

## 2024-12-12 NOTE — PLAN OF CARE
Problem: Postpartum ( Delivery)  Goal: Successful Parent Role Transition  Outcome: Progressing     Problem: Postpartum ( Delivery)  Goal: Absence of Infection Signs and Symptoms  Outcome: Progressing     Problem: Postpartum ( Delivery)  Goal: Optimal Pain Control and Function  Outcome: Progressing     Problem: Postpartum ( Delivery)  Goal: Absence of Infection Signs and Symptoms  Outcome: Progressing   Goal Outcome Evaluation:

## 2024-12-12 NOTE — ANESTHESIA POSTPROCEDURE EVALUATION
Patient: Kay Vieyra    Procedure: Procedure(s):  PRIMARY  SECTION       Anesthesia Type:  Spinal    Note:  Disposition: Inpatient   Postop Pain Control: Uneventful            Sign Out: Well controlled pain   PONV: No   Neuro/Psych: Uneventful            Sign Out: Acceptable/Baseline neuro status   Airway/Respiratory: Uneventful            Sign Out: Acceptable/Baseline resp. status   CV/Hemodynamics: Uneventful            Sign Out: Acceptable CV status; No obvious hypovolemia; No obvious fluid overload   Other NRE:    DID A NON-ROUTINE EVENT OCCUR?        Last vitals:  Vitals:    24 0209 24 0600 24 0800   BP: 117/57 133/67 114/67   Pulse: 81 77 82   Resp:   16   Temp:   36.6  C (97.8  F)   SpO2: 100% 97% 97%       Electronically Signed By: Justen Lockhart MD  2024  10:17 AM

## 2024-12-12 NOTE — PROGRESS NOTES
1000 - 1300    VSS. Pt reported 7/10 incisional pain and requested PRN oxycodone x1. Liquid bandage to incision, minimal dried drainage noted, incision well approximated. Pt independent and bonding well with infant. Family and  at bedside.

## 2024-12-12 NOTE — PLAN OF CARE
Goal Outcome Evaluation:       Vitals, fundal assessment, and lochia wdl. Denies preeclampsia symptoms. +2 patellar reflexes. +2 Bilateral lower limb edema. Bonding well with infant in Replaced by [facility]. Ambulating and voiding independently. Pain is being managed by tylenol and ice packs. Liquid bandage has minimal old drainage but otherwise clean, dry, and intact. Encouraged to use the breast pump every 2-3 hours. Educated on hands on pumping. Left nipple measure 20 mm and right 21mm. Encouraged to try 24 mm flange to have a pain-free pumping experience.     Problem: Postpartum ( Delivery)  Goal: Absence of Infection Signs and Symptoms  Outcome: Progressing     Problem: Postpartum ( Delivery)  Goal: Optimal Pain Control and Function  Outcome: Progressing     Problem: Breastfeeding  Goal: Effective Breastfeeding  Outcome: Progressing  Intervention: Promote Breast Care and Comfort  Recent Flowsheet Documentation  Taken 2024 by Sridevi Glass RN  Breast Care: double electric breast pump utilized  Intervention: Promote Effective Breastfeeding  Recent Flowsheet Documentation  Taken 2024 2345 by Sridevi Glass RN  Breastfeeding Assistance: support offered     Problem: Hypertensive Disorders in Pregnancy  Goal: Patient-Fetal Stabilization  Outcome: Progressing     Sridevi Glass RN

## 2024-12-13 VITALS
WEIGHT: 208.6 LBS | SYSTOLIC BLOOD PRESSURE: 121 MMHG | RESPIRATION RATE: 16 BRPM | HEIGHT: 66 IN | HEART RATE: 83 BPM | OXYGEN SATURATION: 98 % | BODY MASS INDEX: 33.52 KG/M2 | DIASTOLIC BLOOD PRESSURE: 59 MMHG | TEMPERATURE: 97.9 F

## 2024-12-13 PROBLEM — O13.9 GESTATIONAL HYPERTENSION: Status: ACTIVE | Noted: 2024-12-13

## 2024-12-13 PROCEDURE — 250N000013 HC RX MED GY IP 250 OP 250 PS 637: Performed by: OBSTETRICS & GYNECOLOGY

## 2024-12-13 RX ORDER — OXYCODONE HYDROCHLORIDE 5 MG/1
5-10 TABLET ORAL EVERY 6 HOURS PRN
Qty: 12 TABLET | Refills: 0 | Status: SHIPPED | OUTPATIENT
Start: 2024-12-13

## 2024-12-13 RX ORDER — AMOXICILLIN 250 MG
1 CAPSULE ORAL 2 TIMES DAILY PRN
Qty: 60 TABLET | Refills: 0 | Status: SHIPPED | OUTPATIENT
Start: 2024-12-13

## 2024-12-13 RX ORDER — ACETAMINOPHEN 325 MG/1
325-650 TABLET ORAL EVERY 4 HOURS PRN
COMMUNITY
Start: 2024-12-13

## 2024-12-13 RX ADMIN — OXYCODONE 5 MG: 5 TABLET ORAL at 07:46

## 2024-12-13 RX ADMIN — SENNOSIDES AND DOCUSATE SODIUM 1 TABLET: 50; 8.6 TABLET ORAL at 08:32

## 2024-12-13 RX ADMIN — ACETAMINOPHEN 975 MG: 325 TABLET ORAL at 08:32

## 2024-12-13 RX ADMIN — OXYCODONE 5 MG: 5 TABLET ORAL at 00:09

## 2024-12-13 RX ADMIN — OXYCODONE 5 MG: 5 TABLET ORAL at 12:40

## 2024-12-13 RX ADMIN — ACETAMINOPHEN 975 MG: 325 TABLET ORAL at 02:09

## 2024-12-13 ASSESSMENT — ACTIVITIES OF DAILY LIVING (ADL)
ADLS_ACUITY_SCORE: 31

## 2024-12-13 NOTE — PLAN OF CARE
Problem: Adult Inpatient Plan of Care  Goal: Optimal Comfort and Wellbeing  Outcome: Progressing     Problem: Postpartum ( Delivery)  Goal: Optimal Pain Control and Function  Outcome: Progressing   Goal Outcome Evaluation:  VSS, PRN Tylenol and Oxycodone for pain control. Voiding adequately. Bonding well with infant. Significant other at bedside.

## 2024-12-13 NOTE — LACTATION NOTE
This note was copied from a baby's chart.  LACTATION Handoff    Baby Name: Baby Girl    Birth/Delivery Concerns:  elective primary c/s    Nipple issues: everted    Latch achieved: deep latch and audible swallows    Best position:  cross cradle    Nipple shield: no    Pumping: yes, home pump, and flange size 24    Ed folder reviewed: yes    LACTDC entered: Yes

## 2024-12-13 NOTE — PLAN OF CARE
Problem: Adult Inpatient Plan of Care  Goal: Plan of Care Review  Description: The Plan of Care Review/Shift note should be completed every shift.  The Outcome Evaluation is a brief statement about your assessment that the patient is improving, declining, or no change.  This information will be displayed automatically on your shift  note.  Outcome: Met   Goal Outcome Evaluation:       Patient states understanding of discharge instructions.Ready for discharge.

## 2024-12-13 NOTE — DISCHARGE INSTRUCTIONS
Warning Signs after Having a Baby    Keep this paper on your fridge or somewhere else where you can see it.    Call your provider if you have any of these symptoms up to 12 weeks after having your baby.    Thoughts of hurting yourself or your baby  Pain in your chest or trouble breathing  Severe headache not helped by pain medicine  Eyesight concerns (blurry vision, seeing spots or flashes of light, other changes to eyesight)  Fainting, shaking or other signs of a seizure    Call 9-1-1 if you feel that it is an emergency.     The symptoms below can happen to anyone after giving birth. They can be very serious. Call your provider if you have any of these warning signs.    My provider s phone number: _______________________    Losing too much blood (hemorrhage)    Call your provider if you soak through a pad in less than an hour or pass blood clots bigger than a golf ball. These may be signs that you are bleeding too much.    Blood clots in the legs or lungs    After you give birth, your body naturally clots its blood to help prevent blood loss. Sometimes this increased clotting can happen in other areas of the body, like the legs or lungs. This can block your blood flow and be very dangerous.     Call your provider if you:  Have a red, swollen spot on the back of your leg that is warm or painful when you touch it.   Are coughing up blood.     Infection    Call your provider if you have any of these symptoms:  Fever of 100.4 F (38 C) or higher.  Pain or redness around your stitches if you had an incision.   Any yellow, white, or green fluid coming from places where you had stitches or surgery.    Mood Problems (postpartum depression)    Many people feel sad or have mood changes after having a baby. But for some people, these mood swings are worse.     Call your provider right away if you feel so anxious or nervous that you can't care for yourself or your baby.    Preeclampsia (high blood pressure)    Even if you  "didn't have high blood pressure when you were pregnant, you are at risk for the high blood pressure disease called preeclampsia. This risk can last up to 12 weeks after giving birth.     Call your provider if you have:   Pain on your right side under your rib cage  Sudden swelling in the hands and face    Remember: You know your body. If something doesn't feel right, get medical help.     For informational purposes only. Not to replace the advice of your health care provider. Copyright 2020 [facility]. All rights reserved. Clinically reviewed by Yusra Raygoza, RNC-OB, MSN. [employer] 837777 - Rev .    Assessment of Breastfeeding after discharge: Is baby getting enough to eat?    If you answer  YES  to all these questions by day 5, you will know breastfeeding is going well.    If you answer  NO  to any of these questions, call your baby's medical provider or the lactation clinic.   Refer to \"Postpartum and [place] [facility]\" (PNC) , starting on page 35. (This is the booklet you tracked baby's feedings and diaper counts while in the hospital.)   Please call one of our Outpatient Lactation Consultants at [phone] at any time with breastfeeding questions or concerns.    1.  My milk came in (breasts became gutierrez on day 3-5 after birth).  I am softening the areola using hand expression or reverse pressure softening prior to latch, as needed.  YES NO   2.  My baby breastfeeds at least 8 times in 24 hours. YES NO   3.  My baby usually gives feeding cues (answer  No  if your baby is sleepy and you need to wake baby for most feedings).  *PNC page 36   YES NO   4.  My baby latches on my breast easily.  *PNC page 37  YES NO   5.  During breastfeeding, I hear my baby frequently swallowing, (one-two sucks per swallow).  YES NO   6.  I allow my baby to drain the first breast before I offer the other side.   YES NO   7.  My baby is satisfied after breastfeeding.   *PNC page 39 YES NO   8.  My breasts feel " "gutierrez before feedings and softer after feedings. YES NO   9.  My breasts and nipples are comfortable.  I have no engorgement or cracked nipples.    *PNC Page 40 and 41  YES NO   10.  My baby is meeting the wet diaper goals each day.  *PNC page 38  YES NO   11.  My baby is meeting the soiled diaper goals each day. *PNC page 38 YES NO   12.  My baby is only getting my breast milk, no formula. YES NO   13. I know my baby needs to be back to birth weight by day 14.  YES NO   14. I know my baby will cluster feed and have growth spurts. *PNC page 39  YES NO   15.  I feel confident in breastfeeding.  If not, I know where to get support. YES NO      [employer] has a short video (2:47) called:   \"[place] Hold/Asymmetric Latch\" Breastfeeding Education by [employer].        Other websites:  www.[employer].ca-Breastfeeding Videos  [email or web address]--Our videos-Breastfeeding  [email or web address]    "

## 2024-12-13 NOTE — DISCHARGE SUMMARY
[facility] Discharge Summary    Kay Vieyra MRN# [id number]   Age: 24 year old YOB: 2000     Date of Admission:  12/10/2024  Date of Discharge::  2024  Admitting Physician:  Markel Vicente MD  Discharge Physician:  Suzanne Rg MD     Home clinic: [facility]          Admission Diagnoses:   Desires primary  section  Gestational hypertension          Discharge Diagnosis:     Status post primary   Gestational hypertension          Procedures:     Procedure(s): Primary LTCS            Medications Prior to Admission:     Medications Prior to Admission   Medication Sig Dispense Refill Last Dose/Taking    clotrimazole-betamethasone (LOTRISONE) 1-0.05 % external cream    Past Month    DULoxetine (CYMBALTA) 30 MG capsule TAKE 1 CAPSULE BY MOUTH ONCE DAILY IN THE MORNING WITH 60MG DOSE   2024 Evening    DULoxetine (CYMBALTA) 60 MG capsule Take 60 mg by mouth daily.   2024 Evening    ondansetron (ZOFRAN) 4 MG tablet Take 4 mg by mouth.   Past Month    valACYclovir (VALTREX) 500 MG tablet Take 1 tablet by mouth 2 times daily.   Past Week    VENTOLIN  (90 Base) MCG/ACT inhaler INHALE 2 PUFFS BY MOUTH 4 TIMES DAILY AS NEEDED FOR SHORTNESS OF BREATH AND FOR WHEEZING AND FOR COUGH   More than a month             Discharge Medications:     Current Discharge Medication List        START taking these medications    Details   acetaminophen (TYLENOL) 325 MG tablet Take 1-2 tablets (325-650 mg) by mouth every 4 hours as needed for mild pain.    Associated Diagnoses: S/P  section      oxyCODONE (ROXICODONE) 5 MG tablet Take 1-2 tablets (5-10 mg) by mouth every 6 hours as needed for moderate to severe pain.  Qty: 12 tablet, Refills: 0    Associated Diagnoses: S/P  section      senna-docusate (SENOKOT-S/PERICOLACE) 8.6-50 MG tablet Take 1 tablet by mouth 2 times daily as needed for constipation.  Qty: 60  tablet, Refills: 0    Associated Diagnoses: S/P  section           CONTINUE these medications which have NOT CHANGED    Details   clotrimazole-betamethasone (LOTRISONE) 1-0.05 % external cream       !! DULoxetine (CYMBALTA) 30 MG capsule TAKE 1 CAPSULE BY MOUTH ONCE DAILY IN THE MORNING WITH 60MG DOSE      !! DULoxetine (CYMBALTA) 60 MG capsule Take 60 mg by mouth daily.      ondansetron (ZOFRAN) 4 MG tablet Take 4 mg by mouth.      valACYclovir (VALTREX) 500 MG tablet Take 1 tablet by mouth 2 times daily.      VENTOLIN  (90 Base) MCG/ACT inhaler INHALE 2 PUFFS BY MOUTH 4 TIMES DAILY AS NEEDED FOR SHORTNESS OF BREATH AND FOR WHEEZING AND FOR COUGH       !! - Potential duplicate medications found. Please discuss with provider.                Consultations:   No consultations were requested during this admission          Hospital Course:   See admission H&P and delivery summary for details. The patient's hospital course was unremarkable.  On discharge, her pain was well controlled. Vaginal bleeding/lochia is appropriate for postpartum state.  She is voiding without difficulty, ambulating well and tolerating a normal diet.  Vital signs are stable on the date of discharge, baby is doing well.     Last hemoglobin:   Hemoglobin   Date Value Ref Range Status   2024 9.9 (L) 11.7 - 15.7 g/dL Final             Discharge Instructions and Follow-Up:     Discharge diet: Regular   Discharge activity: Pelvic rest: abstain from intercourse and do not use tampons for 6 week(s)   Discharge follow-up: Follow-up in office for routine postpartum care at 6 weeks, sooner with any concerns or issues, 1 week for BP check   Wound care: Drink plenty of fluids           Discharge Disposition:     Discharged to home      Attestation:  I have reviewed today's vital signs, notes, medications, labs.    Suzanne Rg MD

## 2024-12-13 NOTE — PROGRESS NOTES
Outreach Note for AKIKO Vieyra  [id number]  2000    Chart reviewed, discharge follow-up plan discussed with patient, needs assessed. Patient requests all follow-up through clinic/physician. Patient declines home care visit. Post Partum follow-up appointment  patient will schedule as instructed. Patient states she has good support at home, has baby care essentials, and feels ready to discharge today.    Outreach RN will continue to follow and assist if needed with discharge plan. No further needs identified at this time.    Completed by: Kim Seipel RN

## 2024-12-29 ENCOUNTER — HEALTH MAINTENANCE LETTER (OUTPATIENT)
Age: 24
End: 2024-12-29

## 2025-06-21 ENCOUNTER — HEALTH MAINTENANCE LETTER (OUTPATIENT)
Age: 25
End: 2025-06-21

## (undated) DEVICE — SUTURE PDS 0 60IN CTX+ LOOPED PDP990G

## (undated) DEVICE — DRSG STERI STRIP 1/2X4" R1547

## (undated) DEVICE — ELECTRODE PATIENT RETURN ADULT L10 FT 2 PLATE CORD 0855C

## (undated) DEVICE — BAG BIOHAZARD RED SMALL 24X23" A4823PR

## (undated) DEVICE — PREP CHLORAPREP 26ML TINTED HI-LITE ORANGE 930815

## (undated) DEVICE — ENDO POUCH UNIVERSAL RETRIEVAL SYSTEM INZII 5MM CD003

## (undated) DEVICE — ESU LIGASURE MARYLAND LAPAROSCOPIC SLR/DVDR 5MMX37CM LF1937

## (undated) DEVICE — BLADE KNIFE SURG 11 371111

## (undated) DEVICE — SUTURE MONOCRYL+ 0 CT-1 UND 18 MCP946H

## (undated) DEVICE — SUTURE MONOCRYL+ 4-0 PS-2 27IN MCP426H

## (undated) DEVICE — GLOVE BIOGEL PI INDICATOR 8.0 LF 41680

## (undated) DEVICE — ENDO TROCAR SLEEVE KII Z-THREADED 05X100MM CTS02

## (undated) DEVICE — SUTURE VICRYL+ 0 27IN CT-1 UND VCP260H

## (undated) DEVICE — ENDO TROCAR FIRST ENTRY KII FIOS Z-THRD 05X100MM CTF03

## (undated) DEVICE — SYR 10ML FINGER CONTROL W/O NDL 309695

## (undated) DEVICE — SYSTEM LAPAROVUE VISIBILITY LAPVUE10

## (undated) DEVICE — PAD INSERT MED PEACH 14X6.5 62550

## (undated) DEVICE — SOL WATER IRRIG 1000ML BOTTLE 2F7114

## (undated) DEVICE — Device

## (undated) DEVICE — SU VICRYL+ 2-0 XLH 27IN VIO VCP581G

## (undated) DEVICE — SUCTION MANIFOLD NEPTUNE 2 SYS 1 PORT 702-025-000

## (undated) DEVICE — ADH LIQUID MASTISOL TOPICAL VIAL 2-3ML 0523-48

## (undated) DEVICE — DECANTER VIAL 2006S

## (undated) DEVICE — CUSTOM PACK PELVISCOPY SMA5BPVHEA

## (undated) DEVICE — TUBING LAP SUCT/IRRIG STRYKER 250070500

## (undated) DEVICE — GLOVE UNDER INDICATOR PI SZ 6.5 LF 41665

## (undated) DEVICE — GOWN LG DISP 9515

## (undated) DEVICE — CATH FOLEY 16FR 5ML LUBRICATH LATEX 0165L16

## (undated) DEVICE — PACK MINOR SINGLE BASIN SSK3001

## (undated) DEVICE — SUCTION TIP YANKAUER W/O VENT K86

## (undated) DEVICE — DRAPE SHEET REV FOLD 3/4 9349

## (undated) DEVICE — PREP DYNA-HEX 4% CHG SCRUB 4OZ BOTTLE MDS098710

## (undated) DEVICE — GLOVE BIOGEL PI ULTRATOUCH G SZ 8.0 42180

## (undated) DEVICE — SU MONOCRYL+ 4-0 18IN PS2 UND MCP496G

## (undated) DEVICE — SU VICRYL+ 3-0 KS UNDYED VCP663H

## (undated) DEVICE — DRESSING MEPILEX ADH 4INX10IN STRL LF 496455

## (undated) DEVICE — BANDAGE ADH LF 1X3 ABN3100A

## (undated) DEVICE — TUBING SMOKE EVAC PNEUMOCLEAR HIGH FLOW 0620050250

## (undated) DEVICE — CUSTOM PACK C-SECTION LHE

## (undated) DEVICE — BRIEF STRETCH XL MPS40

## (undated) DEVICE — GLOVE BIOGEL PI ULTRATOUCH G SZ 6.5 42165

## (undated) DEVICE — UNDERPAD 30X30 BULK 949B10

## (undated) DEVICE — SYRINGE 10ML FILL SALINE FLUSH STERILE 306553

## (undated) DEVICE — SOL NACL 0.9% IRRIG 1000ML BOTTLE 2F7124

## (undated) DEVICE — SU DERMABOND ADVANCED .7ML DNX12

## (undated) RX ORDER — FENTANYL CITRATE-0.9 % NACL/PF 10 MCG/ML
PLASTIC BAG, INJECTION (ML) INTRAVENOUS
Status: DISPENSED
Start: 2023-01-11

## (undated) RX ORDER — FENTANYL CITRATE 50 UG/ML
INJECTION, SOLUTION INTRAMUSCULAR; INTRAVENOUS
Status: DISPENSED
Start: 2023-01-11

## (undated) RX ORDER — DEXAMETHASONE SODIUM PHOSPHATE 10 MG/ML
INJECTION, SOLUTION INTRAMUSCULAR; INTRAVENOUS
Status: DISPENSED
Start: 2023-01-11

## (undated) RX ORDER — KETOROLAC TROMETHAMINE 30 MG/ML
INJECTION, SOLUTION INTRAMUSCULAR; INTRAVENOUS
Status: DISPENSED
Start: 2024-12-10

## (undated) RX ORDER — ONDANSETRON 2 MG/ML
INJECTION INTRAMUSCULAR; INTRAVENOUS
Status: DISPENSED
Start: 2024-12-10

## (undated) RX ORDER — PROPOFOL 10 MG/ML
INJECTION, EMULSION INTRAVENOUS
Status: DISPENSED
Start: 2023-01-11

## (undated) RX ORDER — DEXAMETHASONE SODIUM PHOSPHATE 10 MG/ML
INJECTION, EMULSION INTRAMUSCULAR; INTRAVENOUS
Status: DISPENSED
Start: 2024-12-10

## (undated) RX ORDER — BUPIVACAINE HYDROCHLORIDE AND EPINEPHRINE 2.5; 5 MG/ML; UG/ML
INJECTION, SOLUTION INFILTRATION; PERINEURAL
Status: DISPENSED
Start: 2023-01-11

## (undated) RX ORDER — MORPHINE SULFATE 1 MG/ML
INJECTION, SOLUTION EPIDURAL; INTRATHECAL; INTRAVENOUS
Status: DISPENSED
Start: 2024-12-10

## (undated) RX ORDER — FENTANYL CITRATE 50 UG/ML
INJECTION, SOLUTION INTRAMUSCULAR; INTRAVENOUS
Status: DISPENSED
Start: 2024-12-10

## (undated) RX ORDER — KETOROLAC TROMETHAMINE 30 MG/ML
INJECTION, SOLUTION INTRAMUSCULAR; INTRAVENOUS
Status: DISPENSED
Start: 2023-01-11

## (undated) RX ORDER — ONDANSETRON 2 MG/ML
INJECTION INTRAMUSCULAR; INTRAVENOUS
Status: DISPENSED
Start: 2023-01-11